# Patient Record
Sex: FEMALE | Race: OTHER | HISPANIC OR LATINO | URBAN - METROPOLITAN AREA
[De-identification: names, ages, dates, MRNs, and addresses within clinical notes are randomized per-mention and may not be internally consistent; named-entity substitution may affect disease eponyms.]

---

## 2018-07-23 ENCOUNTER — OUTPATIENT (OUTPATIENT)
Dept: OUTPATIENT SERVICES | Facility: HOSPITAL | Age: 33
LOS: 1 days | End: 2018-07-23
Payer: COMMERCIAL

## 2018-07-23 ENCOUNTER — APPOINTMENT (OUTPATIENT)
Dept: RADIOLOGY | Facility: CLINIC | Age: 33
End: 2018-07-23
Payer: COMMERCIAL

## 2018-07-23 DIAGNOSIS — R52 PAIN, UNSPECIFIED: ICD-10-CM

## 2018-07-23 PROBLEM — Z00.00 ENCOUNTER FOR PREVENTIVE HEALTH EXAMINATION: Status: ACTIVE | Noted: 2018-07-23

## 2018-07-23 PROCEDURE — 73630 X-RAY EXAM OF FOOT: CPT | Mod: 26,50

## 2018-07-23 PROCEDURE — 73630 X-RAY EXAM OF FOOT: CPT

## 2018-09-11 ENCOUNTER — APPOINTMENT (OUTPATIENT)
Dept: OBGYN | Facility: CLINIC | Age: 33
End: 2018-09-11
Payer: COMMERCIAL

## 2018-09-11 VITALS
BODY MASS INDEX: 33.32 KG/M2 | HEART RATE: 70 BPM | DIASTOLIC BLOOD PRESSURE: 80 MMHG | HEIGHT: 65 IN | WEIGHT: 200 LBS | SYSTOLIC BLOOD PRESSURE: 124 MMHG

## 2018-09-11 DIAGNOSIS — Z78.9 OTHER SPECIFIED HEALTH STATUS: ICD-10-CM

## 2018-09-11 PROCEDURE — 99385 PREV VISIT NEW AGE 18-39: CPT

## 2018-09-24 LAB
C TRACH RRNA SPEC QL NAA+PROBE: NOT DETECTED
CYTOLOGY CVX/VAG DOC THIN PREP: NORMAL
HPV HIGH+LOW RISK DNA PNL CVX: NOT DETECTED
N GONORRHOEA RRNA SPEC QL NAA+PROBE: NOT DETECTED
SOURCE TP AMPLIFICATION: NORMAL

## 2018-10-02 ENCOUNTER — RESULT REVIEW (OUTPATIENT)
Age: 33
End: 2018-10-02

## 2020-06-01 ENCOUNTER — APPOINTMENT (OUTPATIENT)
Dept: OBGYN | Facility: CLINIC | Age: 35
End: 2020-06-01
Payer: MEDICAID

## 2020-06-01 VITALS
BODY MASS INDEX: 33.87 KG/M2 | DIASTOLIC BLOOD PRESSURE: 50 MMHG | SYSTOLIC BLOOD PRESSURE: 120 MMHG | WEIGHT: 203.31 LBS | HEIGHT: 65 IN

## 2020-06-01 PROCEDURE — 99214 OFFICE O/P EST MOD 30 MIN: CPT

## 2020-06-01 RX ORDER — MEDROXYPROGESTERONE ACETATE 150 MG/ML
150 INJECTION, SUSPENSION INTRAMUSCULAR
Refills: 0 | Status: DISCONTINUED | COMMUNITY
End: 2020-06-01

## 2020-06-03 LAB
C TRACH RRNA SPEC QL NAA+PROBE: NOT DETECTED
CYTOLOGY CVX/VAG DOC THIN PREP: ABNORMAL
HPV HIGH+LOW RISK DNA PNL CVX: NOT DETECTED
N GONORRHOEA RRNA SPEC QL NAA+PROBE: NOT DETECTED
SOURCE TP AMPLIFICATION: NORMAL

## 2020-06-03 RX ORDER — VITAMIN C, CALCIUM, IRON, VITAMIN D3, VITAMIN E, THIAMIN, RIBOFLAVIN, NIACINAMIDE, VITAMIN B6, FOLIC ACID, IODINE, ZINC, COPPER, DOCUSATE SODIUM 120; 85; 30; 3; 20; 20; 1; 25; 2; 50; 159; 4.54; 150; 5; 400; 3.4 MG/1; MG/1; [IU]/1; MG/1; MG/1; MG/1; MG/1; MG/1; MG/1; MG/1; MG/1; MG/1; UG/1; MG/1; [IU]/1; MG/1
90-1 & 300 TABLET ORAL
Qty: 90 | Refills: 3 | Status: DISCONTINUED | COMMUNITY
Start: 2020-06-01 | End: 2020-06-03

## 2020-06-03 RX ORDER — VITAMIN A ACETATE, .BETA.-CAROTENE, ASCORBIC ACID, CHOLECALCIFEROL, .ALPHA.-TOCOPHEROL ACETATE, DL-, THIAMINE MONONITRATE, RIBOFLAVIN, NIACINAMIDE, PYRIDOXINE HYDROCHLORIDE, FOLIC ACID, CYANOCOBALAMIN, CALCIUM CARBONATE, FERROUS FUMARATE, ZINC OXIDE, AND CUPRIC OXIDE 2000; 2000; 120; 400; 22; 1.84; 3; 20; 10; 1; 12; 200; 27; 25; 2 [IU]/1; [IU]/1; MG/1; [IU]/1; MG/1; MG/1; MG/1; MG/1; MG/1; MG/1; UG/1; MG/1; MG/1; MG/1; MG/1
27-1 TABLET ORAL
Qty: 90 | Refills: 3 | Status: ACTIVE | COMMUNITY
Start: 2020-06-03 | End: 1900-01-01

## 2020-06-03 NOTE — CHIEF COMPLAINT
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

## 2020-06-03 NOTE — HISTORY OF PRESENT ILLNESS
[Good] : being in good health [___ Month(s) Ago] : [unfilled] month(s) ago [Healthy Diet] : a healthy diet [Reproductive Age] : is of reproductive age [Last Pap ___] : Last cervical pap smear was [unfilled] [Definite:  ___ (Date)] : the last menstrual period was [unfilled] [Normal Amount/Duration] : was of a normal amount and duration [Frequency: Q ___ days] : menstrual periods occur approximately every [unfilled] days [Menarche Age: ____] : age at menarche was [unfilled] [Regular Cycle Intervals] : periods have been regular [Menstrual Cramps] : menstrual cramps [Sexually Active] : is sexually active [Monogamous] : is monogamous [Male ___] : [unfilled] male [No] : no [Up to Date] : not up to date with ~his/her~ immunizations [HIV] : HIV - [HSV] : HSV - [RPR] : RPR - [Hepatitis] : Hepatitis - [Chlamydia] : Chlamydia - [Gonorrhea] : Gonorrhea - [HPV] : HPV - [de-identified] : no hx of abnormal pap [Spotting Between  Menses] : no spotting between menses [Contraception] : does not use contraception [de-identified] : with partner x 4 years

## 2020-06-10 DIAGNOSIS — Z01.419 ENCOUNTER FOR GYNECOLOGICAL EXAMINATION (GENERAL) (ROUTINE) W/OUT ABNORMAL FINDINGS: ICD-10-CM

## 2020-06-10 DIAGNOSIS — Z13.71 ENCOUNTER FOR NONPROCREATIVE SCREENING FOR GENETIC DISEASE CARRIER STATUS: ICD-10-CM

## 2020-06-10 DIAGNOSIS — Z32.00 ENCOUNTER FOR PREGNANCY TEST, RESULT UNKNOWN: ICD-10-CM

## 2020-06-10 DIAGNOSIS — N91.1 SECONDARY AMENORRHEA: ICD-10-CM

## 2020-06-16 ENCOUNTER — ASOB RESULT (OUTPATIENT)
Age: 35
End: 2020-06-16

## 2020-06-16 ENCOUNTER — APPOINTMENT (OUTPATIENT)
Dept: OBGYN | Facility: CLINIC | Age: 35
End: 2020-06-16
Payer: MEDICAID

## 2020-06-16 ENCOUNTER — APPOINTMENT (OUTPATIENT)
Dept: ANTEPARTUM | Facility: CLINIC | Age: 35
End: 2020-06-16
Payer: MEDICAID

## 2020-06-16 ENCOUNTER — NON-APPOINTMENT (OUTPATIENT)
Age: 35
End: 2020-06-16

## 2020-06-16 VITALS
SYSTOLIC BLOOD PRESSURE: 118 MMHG | DIASTOLIC BLOOD PRESSURE: 68 MMHG | WEIGHT: 205 LBS | HEIGHT: 65 IN | BODY MASS INDEX: 34.16 KG/M2

## 2020-06-16 DIAGNOSIS — R51 HEADACHE: ICD-10-CM

## 2020-06-16 DIAGNOSIS — Z78.9 OTHER SPECIFIED HEALTH STATUS: ICD-10-CM

## 2020-06-16 PROCEDURE — 0500F INITIAL PRENATAL CARE VISIT: CPT

## 2020-06-16 PROCEDURE — 76815 OB US LIMITED FETUS(S): CPT

## 2020-06-16 PROCEDURE — 36415 COLL VENOUS BLD VENIPUNCTURE: CPT

## 2020-06-22 LAB
ABO + RH PNL BLD: NORMAL
ALBUMIN SERPL ELPH-MCNC: 4.2 G/DL
ALP BLD-CCNC: 39 U/L
ALT SERPL-CCNC: 11 U/L
ANION GAP SERPL CALC-SCNC: 20 MMOL/L
APPEARANCE: CLEAR
AR GENE MUT ANL BLD/T: NEGATIVE
AST SERPL-CCNC: 16 U/L
B19V IGG SER QL IA: 0.3 INDEX
B19V IGG+IGM SER-IMP: NEGATIVE
B19V IGG+IGM SER-IMP: NORMAL
B19V IGM FLD-ACNC: 0.1 INDEX
B19V IGM SER-ACNC: NEGATIVE
BASOPHILS # BLD AUTO: 0.04 K/UL
BASOPHILS NFR BLD AUTO: 0.4 %
BILIRUB SERPL-MCNC: <0.2 MG/DL
BILIRUBIN URINE: NEGATIVE
BLD GP AB SCN SERPL QL: NORMAL
BLOOD URINE: NEGATIVE
BUN SERPL-MCNC: 8 MG/DL
CALCIUM SERPL-MCNC: 9.7 MG/DL
CFTR MUT TESTED BLD/T: NEGATIVE
CHLORIDE SERPL-SCNC: 99 MMOL/L
CMV IGG SERPL QL: 9.7 U/ML
CMV IGG SERPL-IMP: POSITIVE
CO2 SERPL-SCNC: 20 MMOL/L
COLOR: YELLOW
CREAT SERPL-MCNC: 0.53 MG/DL
EOSINOPHIL # BLD AUTO: 0.06 K/UL
EOSINOPHIL NFR BLD AUTO: 0.6 %
GLUCOSE QUALITATIVE U: NEGATIVE
GLUCOSE SERPL-MCNC: 53 MG/DL
HBV SURFACE AG SER QL: NONREACTIVE
HCT VFR BLD CALC: 41.6 %
HCV AB SER QL: NONREACTIVE
HCV S/CO RATIO: 0.08 S/CO
HGB A MFR BLD: 97.3 %
HGB A2 MFR BLD: 2.7 %
HGB BLD-MCNC: 13.2 G/DL
HGB FRACT BLD-IMP: NORMAL
HIV1+2 AB SPEC QL IA.RAPID: NONREACTIVE
IMM GRANULOCYTES NFR BLD AUTO: 0.4 %
KETONES URINE: NEGATIVE
LEAD BLD-MCNC: NORMAL UG/DL
LEUKOCYTE ESTERASE URINE: NEGATIVE
LYMPHOCYTES # BLD AUTO: 2.14 K/UL
LYMPHOCYTES NFR BLD AUTO: 20.5 %
M TB IFN-G BLD-IMP: NEGATIVE
MAN DIFF?: NORMAL
MCHC RBC-ENTMCNC: 28.8 PG
MCHC RBC-ENTMCNC: 31.7 GM/DL
MCV RBC AUTO: 90.8 FL
MEV IGG FLD QL IA: 9.5 AU/ML
MEV IGG+IGM SER-IMP: NEGATIVE
MONOCYTES # BLD AUTO: 0.6 K/UL
MONOCYTES NFR BLD AUTO: 5.7 %
MUV AB SER-ACNC: POSITIVE
MUV IGG SER QL IA: 206 AU/ML
NEUTROPHILS # BLD AUTO: 7.57 K/UL
NEUTROPHILS NFR BLD AUTO: 72.4 %
NITRITE URINE: NEGATIVE
PH URINE: 6
PLATELET # BLD AUTO: 301 K/UL
POTASSIUM SERPL-SCNC: 3.7 MMOL/L
PROT SERPL-MCNC: 6.8 G/DL
PROTEIN URINE: NORMAL
QUANTIFERON TB PLUS MITOGEN MINUS NIL: 5.66 IU/ML
QUANTIFERON TB PLUS NIL: 0.01 IU/ML
QUANTIFERON TB PLUS TB1 MINUS NIL: 0.02 IU/ML
QUANTIFERON TB PLUS TB2 MINUS NIL: 0.01 IU/ML
RBC # BLD: 4.58 M/UL
RBC # FLD: 14.1 %
RUBV IGG FLD-ACNC: 1.4 INDEX
RUBV IGG SER-IMP: POSITIVE
SODIUM SERPL-SCNC: 139 MMOL/L
SPECIFIC GRAVITY URINE: 1.03
T GONDII AB SER-IMP: POSITIVE
T GONDII IGG SER QL: 120 IU/ML
T PALLIDUM AB SER QL IA: NEGATIVE
TSH SERPL-ACNC: 2.35 UIU/ML
UROBILINOGEN URINE: NORMAL
VZV AB TITR SER: POSITIVE
VZV IGG SER IF-ACNC: 480.5 INDEX
WBC # FLD AUTO: 10.45 K/UL

## 2020-06-28 LAB — FMR1 GENE MUT ANL BLD/T: NORMAL

## 2020-06-30 ENCOUNTER — APPOINTMENT (OUTPATIENT)
Dept: OBGYN | Facility: CLINIC | Age: 35
End: 2020-06-30
Payer: MEDICAID

## 2020-06-30 ENCOUNTER — NON-APPOINTMENT (OUTPATIENT)
Age: 35
End: 2020-06-30

## 2020-06-30 VITALS
WEIGHT: 207 LBS | BODY MASS INDEX: 34.49 KG/M2 | HEIGHT: 65 IN | SYSTOLIC BLOOD PRESSURE: 118 MMHG | DIASTOLIC BLOOD PRESSURE: 68 MMHG

## 2020-06-30 PROCEDURE — 0502F SUBSEQUENT PRENATAL CARE: CPT

## 2020-07-20 ENCOUNTER — APPOINTMENT (OUTPATIENT)
Dept: ANTEPARTUM | Facility: CLINIC | Age: 35
End: 2020-07-20
Payer: MEDICAID

## 2020-07-20 ENCOUNTER — ASOB RESULT (OUTPATIENT)
Age: 35
End: 2020-07-20

## 2020-07-20 PROCEDURE — 76811 OB US DETAILED SNGL FETUS: CPT

## 2020-07-27 ENCOUNTER — NON-APPOINTMENT (OUTPATIENT)
Age: 35
End: 2020-07-27

## 2020-07-27 ENCOUNTER — APPOINTMENT (OUTPATIENT)
Dept: OBGYN | Facility: CLINIC | Age: 35
End: 2020-07-27
Payer: MEDICAID

## 2020-07-27 VITALS
BODY MASS INDEX: 34.32 KG/M2 | HEIGHT: 65 IN | DIASTOLIC BLOOD PRESSURE: 68 MMHG | WEIGHT: 206 LBS | SYSTOLIC BLOOD PRESSURE: 108 MMHG

## 2020-07-27 LAB
2ND TRIMESTER DATA: NORMAL
AFP PNL SERPL: NORMAL
AFP SERPL-ACNC: NORMAL
B-HCG FREE SERPL-MCNC: NORMAL
CLINICAL BIOCHEMIST REVIEW: NORMAL
INHIBIN A SERPL-MCNC: NORMAL
NOTES NTD: NORMAL
U ESTRIOL SERPL-SCNC: NORMAL

## 2020-07-27 PROCEDURE — 0502F SUBSEQUENT PRENATAL CARE: CPT

## 2020-08-03 ENCOUNTER — APPOINTMENT (OUTPATIENT)
Dept: ANTEPARTUM | Facility: CLINIC | Age: 35
End: 2020-08-03
Payer: MEDICAID

## 2020-08-03 ENCOUNTER — ASOB RESULT (OUTPATIENT)
Age: 35
End: 2020-08-03

## 2020-08-03 PROCEDURE — 76816 OB US FOLLOW-UP PER FETUS: CPT

## 2020-08-24 ENCOUNTER — NON-APPOINTMENT (OUTPATIENT)
Age: 35
End: 2020-08-24

## 2020-08-24 ENCOUNTER — APPOINTMENT (OUTPATIENT)
Dept: OBGYN | Facility: CLINIC | Age: 35
End: 2020-08-24
Payer: MEDICAID

## 2020-08-24 VITALS
SYSTOLIC BLOOD PRESSURE: 112 MMHG | DIASTOLIC BLOOD PRESSURE: 68 MMHG | HEIGHT: 65 IN | BODY MASS INDEX: 34.87 KG/M2 | WEIGHT: 209.31 LBS

## 2020-08-24 PROCEDURE — 0502F SUBSEQUENT PRENATAL CARE: CPT

## 2020-08-29 LAB
BASOPHILS # BLD AUTO: 0.03 K/UL
BASOPHILS NFR BLD AUTO: 0.3 %
EOSINOPHIL # BLD AUTO: 0.03 K/UL
EOSINOPHIL NFR BLD AUTO: 0.3 %
GLUCOSE 1H P 50 G GLC PO SERPL-MCNC: 210 MG/DL
HCT VFR BLD CALC: 39.5 %
HGB BLD-MCNC: 12.4 G/DL
IMM GRANULOCYTES NFR BLD AUTO: 0.8 %
LYMPHOCYTES # BLD AUTO: 1.69 K/UL
LYMPHOCYTES NFR BLD AUTO: 15.7 %
MAN DIFF?: NORMAL
MCHC RBC-ENTMCNC: 28.5 PG
MCHC RBC-ENTMCNC: 31.4 GM/DL
MCV RBC AUTO: 90.8 FL
MONOCYTES # BLD AUTO: 0.45 K/UL
MONOCYTES NFR BLD AUTO: 4.2 %
NEUTROPHILS # BLD AUTO: 8.47 K/UL
NEUTROPHILS NFR BLD AUTO: 78.7 %
PLATELET # BLD AUTO: 300 K/UL
RBC # BLD: 4.35 M/UL
RBC # FLD: 14.6 %
WBC # FLD AUTO: 10.76 K/UL

## 2020-09-11 ENCOUNTER — APPOINTMENT (OUTPATIENT)
Dept: MATERNAL FETAL MEDICINE | Facility: CLINIC | Age: 35
End: 2020-09-11
Payer: MEDICAID

## 2020-09-11 ENCOUNTER — ASOB RESULT (OUTPATIENT)
Age: 35
End: 2020-09-11

## 2020-09-11 VITALS — BODY MASS INDEX: 34.82 KG/M2 | HEIGHT: 65 IN | WEIGHT: 209 LBS

## 2020-09-11 PROCEDURE — G0108 DIAB MANAGE TRN  PER INDIV: CPT | Mod: 95

## 2020-09-11 RX ORDER — LANCETS 33 GAUGE
EACH MISCELLANEOUS
Qty: 1 | Refills: 1 | Status: ACTIVE | COMMUNITY
Start: 2020-09-11 | End: 1900-01-01

## 2020-09-11 RX ORDER — BLOOD-GLUCOSE METER
W/DEVICE EACH MISCELLANEOUS
Qty: 1 | Refills: 0 | Status: ACTIVE | COMMUNITY
Start: 2020-09-11 | End: 1900-01-01

## 2020-09-11 RX ORDER — BLOOD SUGAR DIAGNOSTIC
STRIP MISCELLANEOUS
Qty: 2 | Refills: 3 | Status: ACTIVE | COMMUNITY
Start: 2020-09-11 | End: 1900-01-01

## 2020-09-22 ENCOUNTER — APPOINTMENT (OUTPATIENT)
Dept: OBGYN | Facility: CLINIC | Age: 35
End: 2020-09-22
Payer: MEDICAID

## 2020-09-22 ENCOUNTER — NON-APPOINTMENT (OUTPATIENT)
Age: 35
End: 2020-09-22

## 2020-09-22 VITALS
WEIGHT: 212 LBS | DIASTOLIC BLOOD PRESSURE: 60 MMHG | SYSTOLIC BLOOD PRESSURE: 110 MMHG | BODY MASS INDEX: 35.32 KG/M2 | HEIGHT: 65 IN

## 2020-09-22 DIAGNOSIS — R76.8 OTHER SPECIFIED ABNORMAL IMMUNOLOGICAL FINDINGS IN SERUM: ICD-10-CM

## 2020-09-22 PROCEDURE — 0502F SUBSEQUENT PRENATAL CARE: CPT

## 2020-09-22 RX ORDER — VALACYCLOVIR 500 MG/1
500 TABLET, FILM COATED ORAL DAILY
Qty: 30 | Refills: 6 | Status: ACTIVE | COMMUNITY
Start: 2020-09-22 | End: 1900-01-01

## 2020-09-25 ENCOUNTER — APPOINTMENT (OUTPATIENT)
Dept: ANTEPARTUM | Facility: CLINIC | Age: 35
End: 2020-09-25
Payer: MEDICAID

## 2020-09-25 ENCOUNTER — APPOINTMENT (OUTPATIENT)
Dept: MATERNAL FETAL MEDICINE | Facility: CLINIC | Age: 35
End: 2020-09-25
Payer: MEDICAID

## 2020-09-25 ENCOUNTER — ASOB RESULT (OUTPATIENT)
Age: 35
End: 2020-09-25

## 2020-09-25 VITALS
BODY MASS INDEX: 35.49 KG/M2 | WEIGHT: 213.25 LBS | HEART RATE: 80 BPM | RESPIRATION RATE: 18 BRPM | SYSTOLIC BLOOD PRESSURE: 104 MMHG | OXYGEN SATURATION: 99 % | DIASTOLIC BLOOD PRESSURE: 62 MMHG

## 2020-09-25 PROCEDURE — 76820 UMBILICAL ARTERY ECHO: CPT

## 2020-09-25 PROCEDURE — 76816 OB US FOLLOW-UP PER FETUS: CPT

## 2020-09-25 PROCEDURE — 93976 VASCULAR STUDY: CPT

## 2020-09-25 PROCEDURE — 76821 MIDDLE CEREBRAL ARTERY ECHO: CPT | Mod: 59

## 2020-09-25 PROCEDURE — 99215 OFFICE O/P EST HI 40 MIN: CPT | Mod: 25

## 2020-09-25 RX ORDER — TERCONAZOLE 8 MG/G
0.8 CREAM VAGINAL
Qty: 1 | Refills: 2 | Status: DISCONTINUED | COMMUNITY
Start: 2020-06-16 | End: 2020-09-25

## 2020-09-25 RX ORDER — ACETAMINOPHEN 325 MG/1
325 TABLET, FILM COATED ORAL
Refills: 0 | Status: ACTIVE | COMMUNITY

## 2020-09-25 NOTE — ACTIVE PROBLEMS
[Hypertension] : no hypertension [Heart Disease] : no heart disease [Autoimmune Disease] : no autoimmune disease [Renal Disease] : no kidney disease, no UTI [Neurologic Disorder] : no neurologic disorder, no epilepsy [Psychiatric Disorders] : no psychiatric disorders [Depression] : no depression, no post partum depression [Thrombophlebitis] : no varicosities, no phlebitis [Thyroid Disorder] : no thyroid dysfunction [Blood Transfusion (___ Ml)] : no history of blood transfusion

## 2020-09-25 NOTE — OB HISTORY
[Pregnancy History] : boy [___] : pregnancy complications occured [LMP: ___] : LMP: [unfilled] [WOLFGANG: ___] : WOLFGANG: [unfilled] [EGA: ___ wks] : EGA: [unfilled] wks [Spontaneous] : Spontaneous conception [Definite:  ___ (Date)] : the last menstrual period was [unfilled] [Regular Cycle Intervals] : periods have been regular [FreeTextEntry1] : Prenatal records were available for review. First prenatal visit was on June 16, 2020.\par \par A one-hour Glucola challenge test done on August 26, 2020 reported a glucose of 210 mg/dL; consistent with a diagnosis of gestational diabetes.She had a telehealth visit with our diabetes educator on September 11, 2020 for initial diabetes education and counseling.

## 2020-09-25 NOTE — DISCUSSION/SUMMARY
[FreeTextEntry1] : She is 29 weeks and 4 days gestation by her last menstrual period dates.\par \par She is overweight and obesity has been associated with a number of maternal complications such as gestational diabetes, pre-eclampsia, thrombophlebitis, labor abnormalities, post-term pregnancies,  delivery, and operative complications. Obesity has been associated with adverse fetal outcomes such as late stillbirth and  deliveries.  Obese women also have a two to three-fold increased incidence in congenital anomalies. There were no major fetal malformations seen during the fetal anatomy ultrasound examination. She has been diagnosed with gestational diabetes. \par \par Regarding her gestational diabetes, she states that she has been following the recommended diabetic diet. She performs fasting and 1 hour postprandial self glucose monitoring. My review of her glucose log book from 20 to 20 revealed 8/12 (67%) elevated fasting glucose values. She had one elevated postprandial glucose reading. She appears to be in suboptimal glycemic control. I informed her that maintaining euglycemia is the most important factor associated with good  outcomes in pregnancies complicated by gestational diabetes. I told her that poor glucose control may cause fetal macrosomia, shoulder dystocia,  delivery,  respiratory distress syndrome and  metabolic complications such as hypoglycemia and hyperbilirubinemia. I told her to eat three daily meals with 3 snacks to reduce postprandial glucose fluctuations. I told her that she is at risk for developing gestational hypertension or preeclampsia during the current pregnancy. I also told her that she is at risk for developing type 2 diabetes, metabolic syndrome and cardiovascular disease later in life.  I also recommend a 75 gram 2 hour OGTT approximately 6 - 8 weeks postpartum to determine whether she has impaired glucose tolerance or preexisting diabetes not diagnosed prior to the pregnancy. I believe that she needs more nutritional counseling. I made arrangements for her to see our diabetes educator on 10/9/20. I discussed using medication to lower her fasting glucose levels. I told her that insulin is considered the first line medications for lowering fasting glucose levels. I also told her that metformin is on oral hypoglycemic medication that can also be used to lower fasting glucose levels. She requested to take the oral hypoglycemic medication. I ordered liver function tests and a hemoglobin A1c level. She was scheduled to have a followup [facility] visit in 4 weeks.\par \par She had a prior  section delivery. She was informed of the risks and benefits of a trial of labor. She was informed of the risk of uterine rupture and the associated maternal complications such as hysterectomy, blood transfusions, and intensive care unit admission. She was also informed of the associated  adverse outcomes in cases of uterine rupture such as stillbirth, fetal hypoxia and neurological impairment (cerebral palsy). She expressed a desire to have a repeat  section birth with the current pregnancy. \par \par She is multiparous and we discussed the various methods available for contraception after delivery. She told me that she wants to have an operative sterilization procedure after the repeat  delivery. \par \par \par \par

## 2020-09-29 LAB
ALBUMIN SERPL ELPH-MCNC: 3.7 G/DL
ALP BLD-CCNC: 65 U/L
ALT SERPL-CCNC: 11 U/L
AST SERPL-CCNC: 15 U/L
BILIRUB DIRECT SERPL-MCNC: 0 MG/DL
BILIRUB INDIRECT SERPL-MCNC: 0.1 MG/DL
BILIRUB SERPL-MCNC: <0.2 MG/DL
ESTIMATED AVERAGE GLUCOSE: 108 MG/DL
HBA1C MFR BLD HPLC: 5.4 %
PROT SERPL-MCNC: 6.4 G/DL

## 2020-10-05 ENCOUNTER — APPOINTMENT (OUTPATIENT)
Dept: OBGYN | Facility: CLINIC | Age: 35
End: 2020-10-05
Payer: MEDICAID

## 2020-10-05 VITALS
BODY MASS INDEX: 35.62 KG/M2 | DIASTOLIC BLOOD PRESSURE: 60 MMHG | WEIGHT: 214.06 LBS | SYSTOLIC BLOOD PRESSURE: 108 MMHG

## 2020-10-05 DIAGNOSIS — Z23 ENCOUNTER FOR IMMUNIZATION: ICD-10-CM

## 2020-10-05 PROCEDURE — 0502F SUBSEQUENT PRENATAL CARE: CPT

## 2020-10-05 PROCEDURE — 90471 IMMUNIZATION ADMIN: CPT

## 2020-10-05 PROCEDURE — 90658 IIV3 VACCINE SPLT 0.5 ML IM: CPT

## 2020-10-07 ENCOUNTER — NON-APPOINTMENT (OUTPATIENT)
Age: 35
End: 2020-10-07

## 2020-10-09 ENCOUNTER — ASOB RESULT (OUTPATIENT)
Age: 35
End: 2020-10-09

## 2020-10-09 ENCOUNTER — APPOINTMENT (OUTPATIENT)
Dept: MATERNAL FETAL MEDICINE | Facility: CLINIC | Age: 35
End: 2020-10-09
Payer: MEDICAID

## 2020-10-09 DIAGNOSIS — O24.419 GESTATIONAL DIABETES MELLITUS IN PREGNANCY, UNSPECIFIED CONTROL: ICD-10-CM

## 2020-10-09 PROCEDURE — G0108 DIAB MANAGE TRN  PER INDIV: CPT | Mod: 95

## 2020-10-12 ENCOUNTER — APPOINTMENT (OUTPATIENT)
Dept: ANTEPARTUM | Facility: CLINIC | Age: 35
End: 2020-10-12

## 2020-10-16 ENCOUNTER — ASOB RESULT (OUTPATIENT)
Age: 35
End: 2020-10-16

## 2020-10-16 ENCOUNTER — APPOINTMENT (OUTPATIENT)
Dept: MATERNAL FETAL MEDICINE | Facility: CLINIC | Age: 35
End: 2020-10-16
Payer: MEDICAID

## 2020-10-16 PROCEDURE — G0108 DIAB MANAGE TRN  PER INDIV: CPT | Mod: 95

## 2020-10-23 ENCOUNTER — APPOINTMENT (OUTPATIENT)
Dept: MATERNAL FETAL MEDICINE | Facility: CLINIC | Age: 35
End: 2020-10-23
Payer: MEDICAID

## 2020-10-23 ENCOUNTER — APPOINTMENT (OUTPATIENT)
Dept: ANTEPARTUM | Facility: CLINIC | Age: 35
End: 2020-10-23
Payer: MEDICAID

## 2020-10-23 ENCOUNTER — ASOB RESULT (OUTPATIENT)
Age: 35
End: 2020-10-23

## 2020-10-23 VITALS
WEIGHT: 210 LBS | BODY MASS INDEX: 34.99 KG/M2 | HEIGHT: 65 IN | SYSTOLIC BLOOD PRESSURE: 98 MMHG | HEART RATE: 80 BPM | DIASTOLIC BLOOD PRESSURE: 60 MMHG | OXYGEN SATURATION: 98 % | RESPIRATION RATE: 18 BRPM

## 2020-10-23 VITALS
BODY MASS INDEX: 34.99 KG/M2 | SYSTOLIC BLOOD PRESSURE: 98 MMHG | HEART RATE: 80 BPM | WEIGHT: 210 LBS | DIASTOLIC BLOOD PRESSURE: 60 MMHG | RESPIRATION RATE: 18 BRPM | OXYGEN SATURATION: 98 % | HEIGHT: 65 IN

## 2020-10-23 PROCEDURE — 76821 MIDDLE CEREBRAL ARTERY ECHO: CPT | Mod: 59

## 2020-10-23 PROCEDURE — 99214 OFFICE O/P EST MOD 30 MIN: CPT | Mod: 25

## 2020-10-23 PROCEDURE — 76820 UMBILICAL ARTERY ECHO: CPT

## 2020-10-23 PROCEDURE — 76816 OB US FOLLOW-UP PER FETUS: CPT

## 2020-10-23 PROCEDURE — 76818 FETAL BIOPHYS PROFILE W/NST: CPT

## 2020-10-23 RX ORDER — METFORMIN HYDROCHLORIDE 500 MG/1
500 TABLET, COATED ORAL
Qty: 30 | Refills: 3 | Status: ACTIVE | COMMUNITY
Start: 2020-09-25

## 2020-10-23 NOTE — CHIEF COMPLAINT
[G ___] : G [unfilled] [P ___] : P [unfilled] [de-identified] : having gestational diabetes and requiring oral hypoglycemic medication

## 2020-10-23 NOTE — OB HISTORY
[Pregnancy History] : boy [___] : pregnancy complications occured [LMP: ___] : LMP: [unfilled] [WOLFGANG: ___] : WOLFGANG: [unfilled] [Spontaneous] : Spontaneous conception [Definite:  ___ (Date)] : the last menstrual period was [unfilled] [Regular Cycle Intervals] : periods have been regular [EGA: ___ wks] : EGA: [unfilled] wks [FreeTextEntry1] : Prenatal records were available for review. First prenatal visit was on June 16, 2020.\par \par A one-hour Glucola challenge test done on August 26, 2020 reported a glucose of 210 mg/dL; consistent with a diagnosis of gestational diabetes.She had a telehealth visit with our diabetes educator on September 11, 2020 for initial diabetes education and counseling. \par \par She had a maternal fetal medicine visit with me on September 25, 2020. She was prescribed metformin 500 mg at bedtime to reduce her fasting glucose levels.\par \par She had a followup visit with our diabetes educator on October 9, 2020. The metformin medication was increased to 850 mg at bedtime because of persistently elevated fasting glucose levels. She also had a followup visit on October 16, 2020. She was advised to increase her metformin medication to 1000 mg at bedtime. She informed the diabetes educator that she was willing to take metformin 500 mg at bedtime only because of medication side effects when taking the higher dosages.\par \par \par \par

## 2020-10-23 NOTE — DISCUSSION/SUMMARY
[FreeTextEntry1] : She is 33 weeks and 4 days gestation by her last menstrual period dates.\par \par Regarding her gestational diabetes, she is currently taking metformin 500 mg before bedtime with a snack. She states that she has been following the recommended diabetic diet. She states that she is eating 3 meals and 2 snacks each day. She performs fasting and 1 hour postprandial self glucose monitoring. My review of her glucose log book from 10/17/20 to 10/22/20 revealed 5/6 (83%) elevated fasting glucose values. She had 2 elevated postprandial glucose readings from non adherence to the diet. Her glycemic control remains suboptimal due to the elevated fasting glucose readings. A hemoglobin A1c done on 2020 was 5.4% which corresponds to estimated daily average glucose of 108 mg/dL. I again informed her that maintaining euglycemia is the most important factor associated with good  outcomes in pregnancies complicated by gestational diabetes. I told her that poor glucose control may cause fetal macrosomia, shoulder dystocia,  delivery,  respiratory distress syndrome and  metabolic complications such as hypoglycemia and hyperbilirubinemia. I again told her that she is at risk for developing gestational hypertension or preeclampsia during the current pregnancy.  The metformin dosage was increased to 850 mg at bedtime. However, she stopped taking to 850 mg of metformin because she was experiencing palpitations and tremors when she took the increased dosage. She does not know whether she had low glucose levels. I recommended that she start taking insulin injections at bedtime. She is still refusing to take the insulin injections. She prefers to continue taking the 500 mg of metformin at bedtime, and modify her diet. I provided additional nutritional counseling. I gave her written examples in [place]an of what to eat and what not to eat. She was also given examples of food items to eat for snacks.  She has been scheduled to have a follow up visit with the diabetes educator on 10/30/20. She was scheduled to have a followup [facility] visit in 3 weeks.\par \par \par \par \par \par \par

## 2020-10-23 NOTE — VITALS
[LMP (date): ___] : LMP was on [unfilled] [GA =___ Weeks] : which calculates to a GA of [unfilled] weeks [GA= ___ Days] : and [unfilled] day(s) [WOLFGANG by LMP (date): ___] : The calculated WOLFGANG by LMP is [unfilled] [By LMP] : this is the final WOLFGANG

## 2020-10-26 ENCOUNTER — APPOINTMENT (OUTPATIENT)
Dept: OBGYN | Facility: CLINIC | Age: 35
End: 2020-10-26
Payer: MEDICAID

## 2020-10-26 ENCOUNTER — NON-APPOINTMENT (OUTPATIENT)
Age: 35
End: 2020-10-26

## 2020-10-26 VITALS — DIASTOLIC BLOOD PRESSURE: 62 MMHG | WEIGHT: 210 LBS | BODY MASS INDEX: 34.95 KG/M2 | SYSTOLIC BLOOD PRESSURE: 96 MMHG

## 2020-10-26 DIAGNOSIS — Z3A.34 34 WEEKS GESTATION OF PREGNANCY: ICD-10-CM

## 2020-10-26 PROCEDURE — 36415 COLL VENOUS BLD VENIPUNCTURE: CPT

## 2020-10-26 PROCEDURE — 0502F SUBSEQUENT PRENATAL CARE: CPT

## 2020-10-29 ENCOUNTER — APPOINTMENT (OUTPATIENT)
Dept: ANTEPARTUM | Facility: CLINIC | Age: 35
End: 2020-10-29
Payer: MEDICAID

## 2020-10-29 ENCOUNTER — ASOB RESULT (OUTPATIENT)
Age: 35
End: 2020-10-29

## 2020-10-29 PROCEDURE — 93976 VASCULAR STUDY: CPT

## 2020-10-29 PROCEDURE — 99072 ADDL SUPL MATRL&STAF TM PHE: CPT

## 2020-10-29 PROCEDURE — 76820 UMBILICAL ARTERY ECHO: CPT

## 2020-10-29 PROCEDURE — 76818 FETAL BIOPHYS PROFILE W/NST: CPT

## 2020-11-02 ENCOUNTER — ASOB RESULT (OUTPATIENT)
Age: 35
End: 2020-11-02

## 2020-11-02 ENCOUNTER — APPOINTMENT (OUTPATIENT)
Dept: MATERNAL FETAL MEDICINE | Facility: CLINIC | Age: 35
End: 2020-11-02
Payer: MEDICAID

## 2020-11-02 DIAGNOSIS — Z3A.33 33 WEEKS GESTATION OF PREGNANCY: ICD-10-CM

## 2020-11-02 PROCEDURE — G0108 DIAB MANAGE TRN  PER INDIV: CPT | Mod: 95

## 2020-11-04 LAB — HIV1+2 AB SPEC QL IA.RAPID: NONREACTIVE

## 2020-11-05 ENCOUNTER — APPOINTMENT (OUTPATIENT)
Dept: ANTEPARTUM | Facility: CLINIC | Age: 35
End: 2020-11-05
Payer: MEDICAID

## 2020-11-05 ENCOUNTER — ASOB RESULT (OUTPATIENT)
Age: 35
End: 2020-11-05

## 2020-11-05 PROCEDURE — 99072 ADDL SUPL MATRL&STAF TM PHE: CPT

## 2020-11-05 PROCEDURE — 76818 FETAL BIOPHYS PROFILE W/NST: CPT

## 2020-11-10 DIAGNOSIS — Z3A.35 35 WEEKS GESTATION OF PREGNANCY: ICD-10-CM

## 2020-11-12 ENCOUNTER — APPOINTMENT (OUTPATIENT)
Dept: OBGYN | Facility: CLINIC | Age: 35
End: 2020-11-12
Payer: MEDICAID

## 2020-11-12 ENCOUNTER — APPOINTMENT (OUTPATIENT)
Dept: ANTEPARTUM | Facility: CLINIC | Age: 35
End: 2020-11-12

## 2020-11-12 ENCOUNTER — APPOINTMENT (OUTPATIENT)
Dept: ANTEPARTUM | Facility: CLINIC | Age: 35
End: 2020-11-12
Payer: MEDICAID

## 2020-11-12 ENCOUNTER — NON-APPOINTMENT (OUTPATIENT)
Age: 35
End: 2020-11-12

## 2020-11-12 ENCOUNTER — APPOINTMENT (OUTPATIENT)
Dept: MATERNAL FETAL MEDICINE | Facility: CLINIC | Age: 35
End: 2020-11-12
Payer: MEDICAID

## 2020-11-12 ENCOUNTER — ASOB RESULT (OUTPATIENT)
Age: 35
End: 2020-11-12

## 2020-11-12 VITALS
DIASTOLIC BLOOD PRESSURE: 62 MMHG | SYSTOLIC BLOOD PRESSURE: 108 MMHG | WEIGHT: 210.25 LBS | HEIGHT: 65 IN | BODY MASS INDEX: 35.03 KG/M2

## 2020-11-12 VITALS
RESPIRATION RATE: 16 BRPM | BODY MASS INDEX: 35.16 KG/M2 | WEIGHT: 211 LBS | HEIGHT: 65 IN | OXYGEN SATURATION: 99 % | DIASTOLIC BLOOD PRESSURE: 62 MMHG | HEART RATE: 72 BPM | SYSTOLIC BLOOD PRESSURE: 100 MMHG

## 2020-11-12 PROCEDURE — 0502F SUBSEQUENT PRENATAL CARE: CPT

## 2020-11-12 PROCEDURE — 93976 VASCULAR STUDY: CPT

## 2020-11-12 PROCEDURE — 76820 UMBILICAL ARTERY ECHO: CPT

## 2020-11-12 PROCEDURE — 76818 FETAL BIOPHYS PROFILE W/NST: CPT

## 2020-11-12 PROCEDURE — 99214 OFFICE O/P EST MOD 30 MIN: CPT | Mod: 25

## 2020-11-12 NOTE — DISCUSSION/SUMMARY
[FreeTextEntry1] : Please see the previous consultation for the patient's medical and obstetrical history.  Umm is currently 36 weeks pregnant with history of previous  section, gestational diabetes A2 and maternal obesity.  Dietary consultation has been sent under separate cover.\par \par A biophysical profile and NST were performed today.  Biophysical profile was 8 out of 8 and NST was category 1.  Vital signs today reveal a blood pressure of 100/62, maternal weight is 211 pounds consistent with a BMI of 35.11 kg.  Evaluation of her diabetic flowsheets reveals good control of both fasting and postprandial blood sugars.  Those elevated postprandial blood sugars are due to dietary intake.\par \par Patient is currently on 850 mg of Metformin at bedtime.  No change in her current dosage is indicated.  Hemoglobin A1c was performed in September and was normal at 5.4%.  She will continue with weekly biophysical profile and NST.  A follow-up growth scan in 2 weeks is recommended.  She is scheduled for a repeat  section on .  Delivery prior to 39 weeks is not recommended at this time.  A 75 g 2-hour GCT should be performed after her postpartum visit.  She does complain of palpitations and cardiac evaluation should be performed.  Mitral valve prolapse was discussed.  She should take her Metformin the night prior to her delivery.  All of the above was discussed with the patient and all of her questions were answered.\par \par I spent a total of 30 minutes face-to-face of which greater than 50% was counseling and coordinating care.\par \par Recommendations;\par \par 1.  Continue current ADA diet and home glucose monitoring.\par 2.  Metformin 850 mg p.o. at bedtime.\par 3.  Weekly  testing until delivery.\par 4.  Growth scan in 2 weeks is recommended.\par 5.  Follow-up dietary consultation is scheduled.\par 6.  75 g 2-hour GCT after postpartum visit is recommended.\par 7.  Follow-up maternal-fetal medicine consultation as clinically indicated.

## 2020-11-14 ENCOUNTER — APPOINTMENT (OUTPATIENT)
Dept: ANTEPARTUM | Facility: CLINIC | Age: 35
End: 2020-11-14

## 2020-11-15 LAB
BASOPHILS # BLD AUTO: 0.02 K/UL
BASOPHILS NFR BLD AUTO: 0.2 %
EOSINOPHIL # BLD AUTO: 0.02 K/UL
EOSINOPHIL NFR BLD AUTO: 0.2 %
GP B STREP DNA SPEC QL NAA+PROBE: NORMAL
GP B STREP DNA SPEC QL NAA+PROBE: NOT DETECTED
HCT VFR BLD CALC: 43.8 %
HGB BLD-MCNC: 13.6 G/DL
IMM GRANULOCYTES NFR BLD AUTO: 0.7 %
LYMPHOCYTES # BLD AUTO: 1.58 K/UL
LYMPHOCYTES NFR BLD AUTO: 17.3 %
MAN DIFF?: NORMAL
MCHC RBC-ENTMCNC: 27.8 PG
MCHC RBC-ENTMCNC: 31.1 GM/DL
MCV RBC AUTO: 89.6 FL
MONOCYTES # BLD AUTO: 0.54 K/UL
MONOCYTES NFR BLD AUTO: 5.9 %
NEUTROPHILS # BLD AUTO: 6.92 K/UL
NEUTROPHILS NFR BLD AUTO: 75.7 %
PLATELET # BLD AUTO: 303 K/UL
RBC # BLD: 4.89 M/UL
RBC # FLD: 15.2 %
SOURCE GBS: NORMAL
WBC # FLD AUTO: 9.14 K/UL

## 2020-11-17 ENCOUNTER — OUTPATIENT (OUTPATIENT)
Dept: OUTPATIENT SERVICES | Facility: HOSPITAL | Age: 35
LOS: 1 days | End: 2020-11-17
Payer: MEDICAID

## 2020-11-17 VITALS — TEMPERATURE: 98 F | WEIGHT: 202.83 LBS | HEIGHT: 65 IN

## 2020-11-17 DIAGNOSIS — Z98.891 HISTORY OF UTERINE SCAR FROM PREVIOUS SURGERY: Chronic | ICD-10-CM

## 2020-11-17 DIAGNOSIS — Z86.19 PERSONAL HISTORY OF OTHER INFECTIOUS AND PARASITIC DISEASES: ICD-10-CM

## 2020-11-17 DIAGNOSIS — Z3A.36 36 WEEKS GESTATION OF PREGNANCY: ICD-10-CM

## 2020-11-17 DIAGNOSIS — Z01.818 ENCOUNTER FOR OTHER PREPROCEDURAL EXAMINATION: ICD-10-CM

## 2020-11-17 LAB
ANION GAP SERPL CALC-SCNC: 11 MMOL/L — SIGNIFICANT CHANGE UP (ref 5–17)
APPEARANCE UR: ABNORMAL
BACTERIA # UR AUTO: ABNORMAL
BASOPHILS # BLD AUTO: 0.02 K/UL — SIGNIFICANT CHANGE UP (ref 0–0.2)
BASOPHILS NFR BLD AUTO: 0.2 % — SIGNIFICANT CHANGE UP (ref 0–2)
BILIRUB UR-MCNC: NEGATIVE — SIGNIFICANT CHANGE UP
BLD GP AB SCN SERPL QL: SIGNIFICANT CHANGE UP
BUN SERPL-MCNC: 6 MG/DL — LOW (ref 8–20)
CALCIUM SERPL-MCNC: 8.7 MG/DL — SIGNIFICANT CHANGE UP (ref 8.6–10.2)
CHLORIDE SERPL-SCNC: 101 MMOL/L — SIGNIFICANT CHANGE UP (ref 98–107)
CO2 SERPL-SCNC: 21 MMOL/L — LOW (ref 22–29)
COLOR SPEC: YELLOW — SIGNIFICANT CHANGE UP
CREAT SERPL-MCNC: 0.48 MG/DL — LOW (ref 0.5–1.3)
DIFF PNL FLD: ABNORMAL
EOSINOPHIL # BLD AUTO: 0.02 K/UL — SIGNIFICANT CHANGE UP (ref 0–0.5)
EOSINOPHIL NFR BLD AUTO: 0.2 % — SIGNIFICANT CHANGE UP (ref 0–6)
EPI CELLS # UR: SIGNIFICANT CHANGE UP
GLUCOSE SERPL-MCNC: 93 MG/DL — SIGNIFICANT CHANGE UP (ref 70–99)
GLUCOSE UR QL: NEGATIVE MG/DL — SIGNIFICANT CHANGE UP
HCT VFR BLD CALC: 39.6 % — SIGNIFICANT CHANGE UP (ref 34.5–45)
HGB BLD-MCNC: 13.5 G/DL — SIGNIFICANT CHANGE UP (ref 11.5–15.5)
IMM GRANULOCYTES NFR BLD AUTO: 0.6 % — SIGNIFICANT CHANGE UP (ref 0–1.5)
KETONES UR-MCNC: NEGATIVE — SIGNIFICANT CHANGE UP
LEUKOCYTE ESTERASE UR-ACNC: ABNORMAL
LYMPHOCYTES # BLD AUTO: 1.58 K/UL — SIGNIFICANT CHANGE UP (ref 1–3.3)
LYMPHOCYTES # BLD AUTO: 17.4 % — SIGNIFICANT CHANGE UP (ref 13–44)
MCHC RBC-ENTMCNC: 29 PG — SIGNIFICANT CHANGE UP (ref 27–34)
MCHC RBC-ENTMCNC: 34.1 GM/DL — SIGNIFICANT CHANGE UP (ref 32–36)
MCV RBC AUTO: 85.2 FL — SIGNIFICANT CHANGE UP (ref 80–100)
MONOCYTES # BLD AUTO: 0.62 K/UL — SIGNIFICANT CHANGE UP (ref 0–0.9)
MONOCYTES NFR BLD AUTO: 6.8 % — SIGNIFICANT CHANGE UP (ref 2–14)
NEUTROPHILS # BLD AUTO: 6.79 K/UL — SIGNIFICANT CHANGE UP (ref 1.8–7.4)
NEUTROPHILS NFR BLD AUTO: 74.8 % — SIGNIFICANT CHANGE UP (ref 43–77)
NITRITE UR-MCNC: NEGATIVE — SIGNIFICANT CHANGE UP
PH UR: 6.5 — SIGNIFICANT CHANGE UP (ref 5–8)
PLATELET # BLD AUTO: 248 K/UL — SIGNIFICANT CHANGE UP (ref 150–400)
POTASSIUM SERPL-MCNC: 3.8 MMOL/L — SIGNIFICANT CHANGE UP (ref 3.5–5.3)
POTASSIUM SERPL-SCNC: 3.8 MMOL/L — SIGNIFICANT CHANGE UP (ref 3.5–5.3)
PROT UR-MCNC: 15 MG/DL
RBC # BLD: 4.65 M/UL — SIGNIFICANT CHANGE UP (ref 3.8–5.2)
RBC # FLD: 14.6 % — HIGH (ref 10.3–14.5)
RBC CASTS # UR COMP ASSIST: SIGNIFICANT CHANGE UP /HPF (ref 0–4)
SODIUM SERPL-SCNC: 133 MMOL/L — LOW (ref 135–145)
SP GR SPEC: 1.01 — SIGNIFICANT CHANGE UP (ref 1.01–1.02)
UROBILINOGEN FLD QL: NEGATIVE MG/DL — SIGNIFICANT CHANGE UP
WBC # BLD: 9.08 K/UL — SIGNIFICANT CHANGE UP (ref 3.8–10.5)
WBC # FLD AUTO: 9.08 K/UL — SIGNIFICANT CHANGE UP (ref 3.8–10.5)
WBC UR QL: ABNORMAL

## 2020-11-17 PROCEDURE — T1013: CPT

## 2020-11-17 RX ORDER — VALACYCLOVIR 500 MG/1
850 TABLET, FILM COATED ORAL
Qty: 0 | Refills: 0 | DISCHARGE

## 2020-11-17 NOTE — OB PST NOTE - PMH
COVID-19  positive antibodies 6-20, pt reports symptoms in february 2020  Gestational diabetes mellitus (GDM), antepartum, gestational diabetes method of control unspecified    HSV infection

## 2020-11-19 ENCOUNTER — NON-APPOINTMENT (OUTPATIENT)
Age: 35
End: 2020-11-19

## 2020-11-19 ENCOUNTER — ASOB RESULT (OUTPATIENT)
Age: 35
End: 2020-11-19

## 2020-11-19 ENCOUNTER — APPOINTMENT (OUTPATIENT)
Dept: OBGYN | Facility: CLINIC | Age: 35
End: 2020-11-19
Payer: MEDICAID

## 2020-11-19 ENCOUNTER — APPOINTMENT (OUTPATIENT)
Dept: ANTEPARTUM | Facility: CLINIC | Age: 35
End: 2020-11-19
Payer: MEDICAID

## 2020-11-19 VITALS
WEIGHT: 215.19 LBS | DIASTOLIC BLOOD PRESSURE: 60 MMHG | SYSTOLIC BLOOD PRESSURE: 124 MMHG | BODY MASS INDEX: 35.81 KG/M2

## 2020-11-19 DIAGNOSIS — Z3A.37 37 WEEKS GESTATION OF PREGNANCY: ICD-10-CM

## 2020-11-19 PROCEDURE — 93976 VASCULAR STUDY: CPT

## 2020-11-19 PROCEDURE — 76820 UMBILICAL ARTERY ECHO: CPT

## 2020-11-19 PROCEDURE — 0502F SUBSEQUENT PRENATAL CARE: CPT

## 2020-11-19 PROCEDURE — 76816 OB US FOLLOW-UP PER FETUS: CPT

## 2020-11-19 PROCEDURE — 76818 FETAL BIOPHYS PROFILE W/NST: CPT

## 2020-11-23 ENCOUNTER — APPOINTMENT (OUTPATIENT)
Dept: MATERNAL FETAL MEDICINE | Facility: CLINIC | Age: 35
End: 2020-11-23

## 2020-11-23 PROBLEM — U07.1 COVID-19: Chronic | Status: ACTIVE | Noted: 2020-11-17

## 2020-11-23 PROBLEM — O24.419 GESTATIONAL DIABETES MELLITUS IN PREGNANCY, UNSPECIFIED CONTROL: Chronic | Status: ACTIVE | Noted: 2020-11-17

## 2020-11-23 PROBLEM — B00.9 HERPESVIRAL INFECTION, UNSPECIFIED: Chronic | Status: ACTIVE | Noted: 2020-11-17

## 2020-11-24 PROBLEM — Z3A.37 37 WEEKS GESTATION OF PREGNANCY: Status: RESOLVED | Noted: 2020-11-24 | Resolved: 2020-11-26

## 2020-11-25 ENCOUNTER — ASOB RESULT (OUTPATIENT)
Age: 35
End: 2020-11-25

## 2020-11-25 ENCOUNTER — APPOINTMENT (OUTPATIENT)
Dept: ANTEPARTUM | Facility: CLINIC | Age: 35
End: 2020-11-25
Payer: MEDICAID

## 2020-11-25 PROCEDURE — 76820 UMBILICAL ARTERY ECHO: CPT

## 2020-11-25 PROCEDURE — 76818 FETAL BIOPHYS PROFILE W/NST: CPT

## 2020-11-25 PROCEDURE — 93976 VASCULAR STUDY: CPT

## 2020-11-29 ENCOUNTER — OUTPATIENT (OUTPATIENT)
Dept: OUTPATIENT SERVICES | Facility: HOSPITAL | Age: 35
LOS: 1 days | End: 2020-11-29
Payer: MEDICAID

## 2020-11-29 DIAGNOSIS — Z11.59 ENCOUNTER FOR SCREENING FOR OTHER VIRAL DISEASES: ICD-10-CM

## 2020-11-29 DIAGNOSIS — Z98.891 HISTORY OF UTERINE SCAR FROM PREVIOUS SURGERY: Chronic | ICD-10-CM

## 2020-11-29 LAB — SARS-COV-2 RNA SPEC QL NAA+PROBE: SIGNIFICANT CHANGE UP

## 2020-11-29 PROCEDURE — U0003: CPT

## 2020-11-30 ENCOUNTER — TRANSCRIPTION ENCOUNTER (OUTPATIENT)
Age: 35
End: 2020-11-30

## 2020-12-01 ENCOUNTER — RESULT REVIEW (OUTPATIENT)
Age: 35
End: 2020-12-01

## 2020-12-01 ENCOUNTER — TRANSCRIPTION ENCOUNTER (OUTPATIENT)
Age: 35
End: 2020-12-01

## 2020-12-01 ENCOUNTER — INPATIENT (INPATIENT)
Facility: HOSPITAL | Age: 35
LOS: 2 days | Discharge: HOME | End: 2020-12-04
Attending: SPECIALIST | Admitting: SPECIALIST
Payer: MEDICAID

## 2020-12-01 ENCOUNTER — APPOINTMENT (OUTPATIENT)
Dept: OBGYN | Facility: HOSPITAL | Age: 35
End: 2020-12-01

## 2020-12-01 VITALS
WEIGHT: 210.1 LBS | DIASTOLIC BLOOD PRESSURE: 69 MMHG | RESPIRATION RATE: 20 BRPM | SYSTOLIC BLOOD PRESSURE: 112 MMHG | TEMPERATURE: 98 F | HEART RATE: 81 BPM | HEIGHT: 65 IN

## 2020-12-01 DIAGNOSIS — O34.219 MATERNAL CARE FOR UNSPECIFIED TYPE SCAR FROM PREVIOUS CESAREAN DELIVERY: ICD-10-CM

## 2020-12-01 DIAGNOSIS — Z3A.39 39 WEEKS GESTATION OF PREGNANCY: ICD-10-CM

## 2020-12-01 DIAGNOSIS — Z98.891 HISTORY OF UTERINE SCAR FROM PREVIOUS SURGERY: Chronic | ICD-10-CM

## 2020-12-01 LAB
BLD GP AB SCN SERPL QL: SIGNIFICANT CHANGE UP
GLUCOSE BLDC GLUCOMTR-MCNC: 78 MG/DL — SIGNIFICANT CHANGE UP (ref 70–99)
SARS-COV-2 IGG SERPL QL IA: NEGATIVE — SIGNIFICANT CHANGE UP
SARS-COV-2 IGM SERPL IA-ACNC: <0.1 INDEX — SIGNIFICANT CHANGE UP
T PALLIDUM AB TITR SER: NEGATIVE — SIGNIFICANT CHANGE UP

## 2020-12-01 PROCEDURE — 88307 TISSUE EXAM BY PATHOLOGIST: CPT | Mod: 26

## 2020-12-01 PROCEDURE — 59510 CESAREAN DELIVERY: CPT | Mod: U9

## 2020-12-01 PROCEDURE — 88302 TISSUE EXAM BY PATHOLOGIST: CPT | Mod: 26

## 2020-12-01 PROCEDURE — 58700 REMOVAL OF FALLOPIAN TUBE: CPT

## 2020-12-01 RX ORDER — OXYTOCIN 10 UNIT/ML
333.33 VIAL (ML) INJECTION
Qty: 20 | Refills: 0 | Status: DISCONTINUED | OUTPATIENT
Start: 2020-12-01 | End: 2020-12-04

## 2020-12-01 RX ORDER — LANOLIN
1 OINTMENT (GRAM) TOPICAL EVERY 6 HOURS
Refills: 0 | Status: DISCONTINUED | OUTPATIENT
Start: 2020-12-01 | End: 2020-12-04

## 2020-12-01 RX ORDER — CEFAZOLIN SODIUM 1 G
2000 VIAL (EA) INJECTION ONCE
Refills: 0 | Status: COMPLETED | OUTPATIENT
Start: 2020-12-01 | End: 2020-12-01

## 2020-12-01 RX ORDER — SODIUM CHLORIDE 9 MG/ML
1000 INJECTION, SOLUTION INTRAVENOUS
Refills: 0 | Status: DISCONTINUED | OUTPATIENT
Start: 2020-12-01 | End: 2020-12-04

## 2020-12-01 RX ORDER — MAGNESIUM HYDROXIDE 400 MG/1
30 TABLET, CHEWABLE ORAL
Refills: 0 | Status: DISCONTINUED | OUTPATIENT
Start: 2020-12-01 | End: 2020-12-04

## 2020-12-01 RX ORDER — ACETAMINOPHEN 500 MG
975 TABLET ORAL
Refills: 0 | Status: DISCONTINUED | OUTPATIENT
Start: 2020-12-01 | End: 2020-12-04

## 2020-12-01 RX ORDER — SODIUM CHLORIDE 9 MG/ML
1000 INJECTION, SOLUTION INTRAVENOUS ONCE
Refills: 0 | Status: COMPLETED | OUTPATIENT
Start: 2020-12-01 | End: 2020-12-01

## 2020-12-01 RX ORDER — SODIUM CHLORIDE 9 MG/ML
1000 INJECTION, SOLUTION INTRAVENOUS
Refills: 0 | Status: DISCONTINUED | OUTPATIENT
Start: 2020-12-01 | End: 2020-12-01

## 2020-12-01 RX ORDER — CITRIC ACID/SODIUM CITRATE 300-500 MG
30 SOLUTION, ORAL ORAL ONCE
Refills: 0 | Status: COMPLETED | OUTPATIENT
Start: 2020-12-01 | End: 2020-12-01

## 2020-12-01 RX ORDER — KETOROLAC TROMETHAMINE 30 MG/ML
30 SYRINGE (ML) INJECTION EVERY 6 HOURS
Refills: 0 | Status: DISCONTINUED | OUTPATIENT
Start: 2020-12-01 | End: 2020-12-03

## 2020-12-01 RX ORDER — SIMETHICONE 80 MG/1
80 TABLET, CHEWABLE ORAL EVERY 4 HOURS
Refills: 0 | Status: DISCONTINUED | OUTPATIENT
Start: 2020-12-01 | End: 2020-12-04

## 2020-12-01 RX ORDER — FAMOTIDINE 10 MG/ML
20 INJECTION INTRAVENOUS ONCE
Refills: 0 | Status: COMPLETED | OUTPATIENT
Start: 2020-12-01 | End: 2020-12-01

## 2020-12-01 RX ORDER — DIPHENHYDRAMINE HCL 50 MG
25 CAPSULE ORAL EVERY 6 HOURS
Refills: 0 | Status: DISCONTINUED | OUTPATIENT
Start: 2020-12-01 | End: 2020-12-04

## 2020-12-01 RX ORDER — METOCLOPRAMIDE HCL 10 MG
10 TABLET ORAL ONCE
Refills: 0 | Status: DISCONTINUED | OUTPATIENT
Start: 2020-12-01 | End: 2020-12-01

## 2020-12-01 RX ORDER — ENOXAPARIN SODIUM 100 MG/ML
40 INJECTION SUBCUTANEOUS DAILY
Refills: 0 | Status: DISCONTINUED | OUTPATIENT
Start: 2020-12-01 | End: 2020-12-04

## 2020-12-01 RX ADMIN — SODIUM CHLORIDE 125 MILLILITER(S): 9 INJECTION, SOLUTION INTRAVENOUS at 07:21

## 2020-12-01 RX ADMIN — Medication 30 MILLIGRAM(S): at 20:16

## 2020-12-01 RX ADMIN — Medication 30 MILLIGRAM(S): at 14:03

## 2020-12-01 RX ADMIN — Medication 30 MILLILITER(S): at 07:06

## 2020-12-01 RX ADMIN — Medication 975 MILLIGRAM(S): at 22:00

## 2020-12-01 RX ADMIN — SODIUM CHLORIDE 2000 MILLILITER(S): 9 INJECTION, SOLUTION INTRAVENOUS at 06:45

## 2020-12-01 RX ADMIN — Medication 975 MILLIGRAM(S): at 23:00

## 2020-12-01 RX ADMIN — Medication 975 MILLIGRAM(S): at 16:40

## 2020-12-01 RX ADMIN — Medication 30 MILLIGRAM(S): at 13:48

## 2020-12-01 RX ADMIN — Medication 100 MILLIGRAM(S): at 07:44

## 2020-12-01 RX ADMIN — FAMOTIDINE 20 MILLIGRAM(S): 10 INJECTION INTRAVENOUS at 07:06

## 2020-12-01 RX ADMIN — Medication 30 MILLIGRAM(S): at 20:01

## 2020-12-01 RX ADMIN — Medication 975 MILLIGRAM(S): at 15:43

## 2020-12-01 RX ADMIN — Medication 1000 MILLIUNIT(S)/MIN: at 08:51

## 2020-12-01 RX ADMIN — ENOXAPARIN SODIUM 40 MILLIGRAM(S): 100 INJECTION SUBCUTANEOUS at 18:31

## 2020-12-01 NOTE — OB NEONATOLOGY/PEDIATRICIAN DELIVERY SUMMARY - NS_[facility]_OBGYN_ALL_OB
Rest the back. No heavy lifting or straining until your pain has resolved.     May slowly increased activities as tolerated. If something causes you pain, you are doing too much.    May take tylenol as needed for discomfort.    Do not take any other NSAIDs (ibuprofen, advil, aleve, naprosyn, etc) while taking the Etodolac.    Apply ice multiple times daily for the first 2-3 days. Then, alternate ice and heat multiple times daily. Ice will help to decrease swelling and pain. Heat will help to relax the muscles.    Follow up with physical therapy for evaluation and treatment of your back pain.    Follow up with chiropractic therapy for your back pain.     Follow up with primary care provider in 2-3 weeks if no improvement of symptoms. Sooner if any worsening of symptoms.    Go to the Emergency Department if any weakness, numbness, loss of control of your bowel or bladder, severe worsening pain, or any other concerning symptoms.      Liveborn

## 2020-12-01 NOTE — DISCHARGE NOTE OB - MEDICATION SUMMARY - MEDICATIONS TO STOP TAKING
I will STOP taking the medications listed below when I get home from the hospital:    metFORMIN 850 mg oral tablet  -- 1 tab(s) by mouth once a day (at bedtime)    Valtrex  -- milligram(s)  once a day (at bedtime)

## 2020-12-01 NOTE — OB PROVIDER H&P - ASSESSMENT
A/P: 35 year old  at 39w6d who is being admitted to L&D for a scheduled repeat  section.   -Admit to L&D  -Consent  -Admission labs  -NPO, except ice chips   -IV fluids  -Ancef 2g ordered  -Fetus: Continuous toco and fetal monitoring.   -GBS: Negative, no GBS ppx required   -DVT ppx: Ambulate and SCD's while in bed     Discussed with  A/P: 35 year old  at 39w6d who is being admitted to L&D for a scheduled repeat  section.     -Admit to L&D  -Consent  -Admission labs  -NPO, except ice chips   -IV fluids  -Ancef 2g ordered  -Fetus: Continuous toco and fetal monitoring.   -GBS: Negative, no GBS ppx required   -DVT ppx: Ambulate and SCD's while in bed     Discussed with Dr. Lopez A/P: 35 year old  at 39w6d who is being admitted to L&D for a scheduled repeat  section.     -Admit to L&D  -Consent  -Admission labs  -NPO, except ice chips   -IV fluids  -Ancef 2g ordered  -Fetus: Continuous toco and fetal monitoring.   -GBS: Negative, no GBS ppx required   -DVT ppx: Ambulate and SCD's while in bed     Discussed with Dr. Lopez    ATTENDING NOTE  34 yo  with hx of Csection x 1, GDMA2 at 39+6 weeks presents for scheduled repeat Csection and bilateral salpingectomy. She has no complaints. VSS afebrile. FHT CAT 1, 130 moderate variability accels present, no decels, no contractions. Patient was counseled at length in the office on risks and benefits of the procedure. we reviewed the risks and benefits via   Elly. Today she had no further complaints.  She signed informed consent for the procedure. Will proceed with the scheduled surgery.    BARBARA Lopez MD A/P: 35 year old  at 39w6d who is being admitted to L&D for a scheduled repeat  section.     -Admit to L&D  -Consent  -Admission labs  -NPO, except ice chips   -IV fluids  -Ancef 2g ordered  -Fetus: Continuous toco and fetal monitoring.   -GBS: Negative, no GBS ppx required   -DVT ppx: Ambulate and SCD's while in bed     Discussed with Dr. Lopez    ATTENDING NOTE  36 yo  with hx of Csection x 1, GDMA2 at 39+ weeks presents for scheduled repeat Csection and bilateral salpingectomy. She has no complaints. VSS afebrile. FHT CAT 1, 130 moderate variability accels present, no decels, no contractions. Patient was counseled at length in the office on risks and benefits of the procedure. we reviewed the risks and benefits via   Elly. Today she had no further complaints.  She signed informed consent for the procedure. Will proceed with the scheduled surgery.    BARBARA Lopez MD

## 2020-12-01 NOTE — DISCHARGE NOTE OB - CARE PROVIDER_API CALL
Daiana Lopez  Obstetrics and Gynecology  [address] [facility] [address], [address] [place] [address]  Phone: [phone]  Fax: [phone]  Follow Up Time:

## 2020-12-01 NOTE — OB RN DELIVERY SUMMARY - NS_SEPSISRSKCALC_OBGYN_ALL_OB_FT
EOS calculated successfully. EOS Risk Factor: 0.5/1000 live births ([facility] national incidence); GA=39w1d; Temp=98.3; ROM=0.05; GBS='Negative'; Antibiotics='No antibiotics or any antibiotics < 2 hrs prior to birth'

## 2020-12-01 NOTE — OB NEONATOLOGY/PEDIATRICIAN DELIVERY SUMMARY - NSPEDSNEONOTESA_OBGYN_ALL_OB_FT
Requested by Dr. Lopez to attend this  R C/Sof a 36 y/o G [id number] mom at 39.1 weeks GA.  She had + PNC, is  A positive, HIV negative, HBsAg negative, GBS negative, Rubella Imn, RPR NR.   Maternal history  pertinent for GDM diet to medformin. History of COVID AB , HSV on Valtrex no recent outbreak .  L&D: SROM at time of delivery.  Baby born vertex with good tone and cry, transferred to warmer, orally suctioned, dried,  stimulated  Resuscitation: routine.  Apgar score 9/9. PE findings showed to father and then transferred to mom for STS.    BW: g  A:  39.1week AGA female  maternal COVID negative  P: Transition to Regular Nursery under PMD/ Hospitalist care.      Admit to NBN        Baby left in stable condition with the parents and L&D staff. Requested by Dr. Lopez to attend this  R C/Sof a 36 y/o G [id number] mom at 39.1 weeks GA.  She had + PNC, is  A positive, HIV negative, HBsAg negative, GBS negative, Rubella Imn, RPR NR.   Maternal history  pertinent for GDM diet to medformin. History of COVID AB , HSV on Valtrex no recent outbreak .  L&D: SROM at time of delivery.  Baby born vertex with good tone and cry, transferred to warmer, orally suctioned, dried,  stimulated  Resuscitation: routine.  Apgar score 9/9. PE findings showed to father and then transferred to mom for STS.    BW: 3220g  A:  39.1week AGA female  maternal COVID negative  P: Transition to Regular Nursery under PMD/ Hospitalist care.      Admit to NBN        Baby left in stable condition with the parents and L&D staff.

## 2020-12-01 NOTE — DISCHARGE NOTE OB - MEDICATION SUMMARY - MEDICATIONS TO TAKE
I will START or STAY ON the medications listed below when I get home from the hospital:    acetaminophen 325 mg oral tablet  -- 2 tab(s) by mouth every 6 hours, As Needed -for mild pain - for moderate pain   -- This product contains acetaminophen.  Do not use  with any other product containing acetaminophen to prevent possible liver damage.    -- Indication: For Pain    ibuprofen 600 mg oral tablet  -- 1 tab(s) by mouth every 6 hours, As Needed -for mild pain - for moderate pain   -- Do not take this drug if you are pregnant.  It is very important that you take or use this exactly as directed.  Do not skip doses or discontinue unless directed by your doctor.  May cause drowsiness or dizziness.  Obtain medical advice before taking any non-prescription drugs as some may affect the action of this medication.  Take with food or milk.    -- Indication: For Pain    oxyCODONE 5 mg oral tablet  -- 1 tab(s) by mouth every 6 hours, As Needed -for severe pain MDD:3  -- Caution federal law prohibits the transfer of this drug to any person other  than the person for whom it was prescribed.  It is very important that you take or use this exactly as directed.  Do not skip doses or discontinue unless directed by your doctor.  May cause drowsiness or dizziness.  This prescription cannot be refilled.  Using more of this medication than prescribed may cause serious breathing problems.    -- Indication: For Pain

## 2020-12-01 NOTE — DISCHARGE NOTE OB - PLAN OF CARE
Rapid recovery 1) Please take ibuprofen, tylenol and/or oxycodone as needed for pain as prescribed.  2) Nothing in the vagina for 6 weeks (including no sex, no tampons, and no douching).  3) Please call your doctor for a follow up your postpartum appointment in 2 weeks.  4) Please continue taking vitamins postpartum.   5) Please call the office sooner if you have heavy vaginal bleeding, severe abdominal pain, or fever > 100.4F.  6) You may resume regular daily activity as tolerated

## 2020-12-01 NOTE — DISCHARGE NOTE OB - PATIENT PORTAL LINK FT
You can access the FollowMyHealth Patient Portal offered by [facility] by registering at the following website: [email or web address]. By joining [employer]’s FollowMyHealth portal, you will also be able to view your health information using other applications (apps) compatible with our system.

## 2020-12-01 NOTE — OB PROVIDER H&P - HISTORY OF PRESENT ILLNESS
Patient is a 35 year old  at 39w6d who presents to L&D for a scheduled repeat  section.   WOLFGANG: 2020   LMP: 3/2/2020   Pregnancy course is significant for:    1. GDMA2, on metformin (850mg)   2. HSV2, on Valtrex     3. Obesity    4. COVID during pregnancy     Obhx:   G1: ,  primary C/S for   Pmhx: GDMA2, HSV2 infection   Pshx: C/S ()   Meds: PNVs, metformin (850mg), valtrex   Allergies: NKDA   BMI: 34   Ultrasound: vertex, posterior placenta ()   EFW: 3735g     GBS: Negative  HIV: NR  RPR: NR  HepB: NR  Rubella: Immune  ABO: A+  COVID negative

## 2020-12-01 NOTE — OB PROVIDER H&P - NSHPPHYSICALEXAM_GEN_ALL_CORE
Vital Signs Last 24 Hrs  T(C): 36.8 (01 Dec 2020 06:32), Max: 36.8 (01 Dec 2020 06:20)  T(F): 98.24 (01 Dec 2020 06:32), Max: 98.3 (01 Dec 2020 06:20)  HR: 81 (01 Dec 2020 06:43) (81 - 81)  BP: 112/69 (01 Dec 2020 06:43) (112/69 - 112/69)  RR: 20 (01 Dec 2020 06:20) (20 - 20)  General: Alert and oriented x3, no acute distress  Cardiovascular: regular rate and rhythm, no murmurs, rubs or gallops appreciated on exam  Respiratory: clear to auscultation bilaterally  Abdominal: gravid uterus, non-tender to palpation  Pelvic: deferred  Extremities: no redness, tenderness or swelling in lower extremities bilaterally     FHT: baseline 120 bpm, moderate variability, +accels, -deccels  [place]: no contractions    Ultrasound: vertex presentation, posterior placenta

## 2020-12-01 NOTE — DISCHARGE NOTE OB - CARE PLAN
Principal Discharge DX:	 delivery delivered  Goal:	Rapid recovery  Assessment and plan of treatment:	1) Please take ibuprofen, tylenol and/or oxycodone as needed for pain as prescribed.  2) Nothing in the vagina for 6 weeks (including no sex, no tampons, and no douching).  3) Please call your doctor for a follow up your postpartum appointment in 2 weeks.  4) Please continue taking vitamins postpartum.   5) Please call the office sooner if you have heavy vaginal bleeding, severe abdominal pain, or fever > 100.4F.  6) You may resume regular daily activity as tolerated

## 2020-12-02 LAB
BASOPHILS # BLD AUTO: 0.03 K/UL — SIGNIFICANT CHANGE UP (ref 0–0.2)
BASOPHILS NFR BLD AUTO: 0.3 % — SIGNIFICANT CHANGE UP (ref 0–2)
EOSINOPHIL # BLD AUTO: 0.03 K/UL — SIGNIFICANT CHANGE UP (ref 0–0.5)
EOSINOPHIL NFR BLD AUTO: 0.3 % — SIGNIFICANT CHANGE UP (ref 0–6)
HCT VFR BLD CALC: 33.3 % — LOW (ref 34.5–45)
HGB BLD-MCNC: 11.1 G/DL — LOW (ref 11.5–15.5)
IMM GRANULOCYTES NFR BLD AUTO: 0.4 % — SIGNIFICANT CHANGE UP (ref 0–1.5)
LYMPHOCYTES # BLD AUTO: 1.35 K/UL — SIGNIFICANT CHANGE UP (ref 1–3.3)
LYMPHOCYTES # BLD AUTO: 14 % — SIGNIFICANT CHANGE UP (ref 13–44)
MCHC RBC-ENTMCNC: 28.8 PG — SIGNIFICANT CHANGE UP (ref 27–34)
MCHC RBC-ENTMCNC: 33.3 GM/DL — SIGNIFICANT CHANGE UP (ref 32–36)
MCV RBC AUTO: 86.5 FL — SIGNIFICANT CHANGE UP (ref 80–100)
MONOCYTES # BLD AUTO: 0.74 K/UL — SIGNIFICANT CHANGE UP (ref 0–0.9)
MONOCYTES NFR BLD AUTO: 7.7 % — SIGNIFICANT CHANGE UP (ref 2–14)
NEUTROPHILS # BLD AUTO: 7.48 K/UL — HIGH (ref 1.8–7.4)
NEUTROPHILS NFR BLD AUTO: 77.3 % — HIGH (ref 43–77)
PLATELET # BLD AUTO: 211 K/UL — SIGNIFICANT CHANGE UP (ref 150–400)
RBC # BLD: 3.85 M/UL — SIGNIFICANT CHANGE UP (ref 3.8–5.2)
RBC # FLD: 14.6 % — HIGH (ref 10.3–14.5)
WBC # BLD: 9.67 K/UL — SIGNIFICANT CHANGE UP (ref 3.8–10.5)
WBC # FLD AUTO: 9.67 K/UL — SIGNIFICANT CHANGE UP (ref 3.8–10.5)

## 2020-12-02 RX ORDER — VALACYCLOVIR 500 MG/1
0 TABLET, FILM COATED ORAL
Qty: 0 | Refills: 0 | DISCHARGE

## 2020-12-02 RX ORDER — METFORMIN HYDROCHLORIDE 850 MG/1
1 TABLET ORAL
Qty: 0 | Refills: 0 | DISCHARGE

## 2020-12-02 RX ORDER — OXYCODONE HYDROCHLORIDE 5 MG/1
1 TABLET ORAL
Qty: 10 | Refills: 0
Start: 2020-12-02

## 2020-12-02 RX ORDER — IBUPROFEN 200 MG
1 TABLET ORAL
Qty: 56 | Refills: 0
Start: 2020-12-02 | End: 2020-12-15

## 2020-12-02 RX ORDER — ACETAMINOPHEN 500 MG
2 TABLET ORAL
Qty: 112 | Refills: 0
Start: 2020-12-02 | End: 2020-12-15

## 2020-12-02 RX ADMIN — ENOXAPARIN SODIUM 40 MILLIGRAM(S): 100 INJECTION SUBCUTANEOUS at 20:02

## 2020-12-02 RX ADMIN — Medication 30 MILLIGRAM(S): at 09:43

## 2020-12-02 RX ADMIN — Medication 30 MILLIGRAM(S): at 03:35

## 2020-12-02 RX ADMIN — Medication 975 MILLIGRAM(S): at 23:28

## 2020-12-02 RX ADMIN — Medication 30 MILLIGRAM(S): at 09:28

## 2020-12-02 RX ADMIN — Medication 30 MILLIGRAM(S): at 20:30

## 2020-12-02 RX ADMIN — Medication 30 MILLIGRAM(S): at 03:20

## 2020-12-02 RX ADMIN — Medication 30 MILLIGRAM(S): at 15:45

## 2020-12-02 RX ADMIN — Medication 975 MILLIGRAM(S): at 13:17

## 2020-12-02 RX ADMIN — Medication 975 MILLIGRAM(S): at 23:44

## 2020-12-02 RX ADMIN — Medication 30 MILLIGRAM(S): at 16:00

## 2020-12-02 RX ADMIN — Medication 30 MILLIGRAM(S): at 20:01

## 2020-12-02 RX ADMIN — Medication 975 MILLIGRAM(S): at 12:17

## 2020-12-02 NOTE — PROGRESS NOTE ADULT - SUBJECTIVE AND OBJECTIVE BOX
Name: MONIK SCHAFER  MRN: [id number]  Date Admitted: 20  Location: [facility]2013 ([facility])  Attending: Daiana Lopez      Post Partum Note:     MONIK SCHAFER is a 35y  s/p repeat  section and bilateral salpingectomy POD #1. Viable Female infant.    SUBJECTIVE:  No acute events overnight. Pain is well controlled with PRN pain medication. No problems with ambulating, voiding, or PO intake. Has had flatus but no BM. Denies N/V. Patient is having normal lochia which is decreasing.    She is breastfeeding and the baby is latching on.     OBJECTIVE:    Vital Signs Last 24 Hrs  T(C): 36.7 (02 Dec 2020 04:35), Max: 36.9 (01 Dec 2020 15:23)  T(F): 98 (02 Dec 2020 04:35), Max: 98.5 (01 Dec 2020 23:54)  HR: 69 (02 Dec 2020 04:35) (63 - 81)  BP: 94/58 (02 Dec 2020 04:35) (90/50 - 118/92)  BP(mean): 75 (01 Dec 2020 11:50) (68 - 101)  RR: 18 (02 Dec 2020 04:35) (16 - 20)  SpO2: 97% (02 Dec 2020 04:35) (97% - 100%)    Physical exam:  General: AOx3, NAD.  Heart: RRR. S1S2.  Lungs: CTABL. Good airflow bilaterally.   Abdomen: +BS, Soft, appropriately tender, nondistended, no guarding or rebound tenderness, firm uterine fundus at umbilicus, the incision is clean dry and intact with steri-strips. No erythema or discharge.  Ext: No DVT signs, warm extremities.        LABS:        MONIK SCHAFER is a 35y  s/p repeat  section and bilateral salpingectomy POD #1. Viable Female infant.  - Pain controlled on current medications  -Tolerating po,   - +/- flatus  - +/- void  - hgb    -->                : Plan   -  +/- Lovenox for DVT prophylaxis   - Rh -/+   Rhogam received/ordered  - Pt with male infant wants/denies circumscision  - Pt with female infant   - Dispo: Home pending attending approval/Continue post op care   Name: MONIK SCHAFER  MRN: [id number]  Date Admitted: 20  Location: [facility] 2013 ([facility])  Attending: Daiana Lopez      Post Partum Note:     MONIK SCHAFER is a 35y  s/p repeat  section and bilateral salpingectomy POD #1. Viable Female infant.    SUBJECTIVE:  No acute events overnight. Pain is well controlled with PRN pain medication. No problems with ambulating, voiding, or PO intake. Has had flatus but no BM. Denies N/V. Patient is having normal lochia which is decreasing.    She is breastfeeding and the baby is latching on.     OBJECTIVE:    Vital Signs Last 24 Hrs  T(C): 36.7 (02 Dec 2020 04:35), Max: 36.9 (01 Dec 2020 15:23)  T(F): 98 (02 Dec 2020 04:35), Max: 98.5 (01 Dec 2020 23:54)  HR: 69 (02 Dec 2020 04:35) (63 - 81)  BP: 94/58 (02 Dec 2020 04:35) (90/50 - 118/92)  BP(mean): 75 (01 Dec 2020 11:50) (68 - 101)  RR: 18 (02 Dec 2020 04:35) (16 - 20)  SpO2: 97% (02 Dec 2020 04:35) (97% - 100%)    Physical exam:  General: AOx3, NAD.  Heart: RRR. S1S2.  Lungs: CTABL. Good airflow bilaterally.   Abdomen: +BS, Soft, appropriately tender, nondistended, no guarding or rebound tenderness, firm uterine fundus at umbilicus, the incision is clean dry and intact with steri-strips. No erythema or discharge.  Ext: No DVT signs, warm extremities.        LABS: Am cbc pending

## 2020-12-03 LAB — SURGICAL PATHOLOGY STUDY: SIGNIFICANT CHANGE UP

## 2020-12-03 RX ORDER — IBUPROFEN 200 MG
600 TABLET ORAL EVERY 6 HOURS
Refills: 0 | Status: DISCONTINUED | OUTPATIENT
Start: 2020-12-03 | End: 2020-12-04

## 2020-12-03 RX ADMIN — Medication 30 MILLIGRAM(S): at 03:36

## 2020-12-03 RX ADMIN — Medication 975 MILLIGRAM(S): at 05:43

## 2020-12-03 RX ADMIN — Medication 30 MILLIGRAM(S): at 04:14

## 2020-12-03 RX ADMIN — Medication 600 MILLIGRAM(S): at 13:24

## 2020-12-03 RX ADMIN — Medication 975 MILLIGRAM(S): at 22:29

## 2020-12-03 RX ADMIN — Medication 600 MILLIGRAM(S): at 17:53

## 2020-12-03 RX ADMIN — Medication 600 MILLIGRAM(S): at 18:23

## 2020-12-03 RX ADMIN — ENOXAPARIN SODIUM 40 MILLIGRAM(S): 100 INJECTION SUBCUTANEOUS at 21:22

## 2020-12-03 RX ADMIN — Medication 975 MILLIGRAM(S): at 06:23

## 2020-12-03 RX ADMIN — Medication 600 MILLIGRAM(S): at 12:54

## 2020-12-03 RX ADMIN — Medication 975 MILLIGRAM(S): at 21:22

## 2020-12-03 NOTE — PROGRESS NOTE ADULT - ASSESSMENT
ASSESSMENT  MONIK SCHAFER is a 35y  who is post-op day #2 who delivered a female infant at 39w6d by repeat  delivery and bilateral salpingectomy. No acute events.     PLAN  1. Routine post-op care  - Continue to encourage ambulation, PO intake and breastfeeding  - DVT prophylaxis: lovenox, SCDs  - Continue pain management PRN  - Plan to discharge post-op day 3 ASSESSMENT  MONIK SCHAFER is a 35y  who is post-op day #2 who delivered a female infant at 39w6d by repeat  delivery and bilateral salpingectomy. No acute events.     PLAN  1. Routine post-op care  - Continue to encourage ambulation, PO intake and breastfeeding  - DVT prophylaxis: lovenox, SCDs  - Continue pain management PRN  - continue intrapartum care     I have read and agree with everything in the above note.     Clarisa Ledbetter- PGY4

## 2020-12-03 NOTE — PROGRESS NOTE ADULT - SUBJECTIVE AND OBJECTIVE BOX
Delivery Progress Note    MONIK SCHAFER is a 35y  who is post-op day #2 who delivered a female infant at 39w6d by repeat  delivery and bilateral salpingectomy.     SUBJECTIVE:  No acute events overnight. Pain is well controlled with PRN pain medication. No problems with ambulating, voiding, or PO intake. Has had flatus but no BM. Denies nausea and vomiting. Patient is having appropriate lochia which is decreasing.     OBJECTIVE:  Physical exam:  Vital Signs Last 24 Hrs  T(C): 36.7 (03 Dec 2020 02:40), Max: 37.3 (02 Dec 2020 10:12)  T(F): 98 (03 Dec 2020 02:40), Max: 99.1 (02 Dec 2020 10:12)  HR: 79 (03 Dec 2020 02:40) (79 - 87)  BP: 97/62 (03 Dec 2020 02:40) (90/58 - 97/62)  RR: 17 (03 Dec 2020 02:40) (17 - 18)  SpO2: 97% (03 Dec 2020 02:40) (97% - 100%)  General: alert and oriented x3, no acute distress.  Heart: regular rate and rhythm, no murmurs, rubs or gallops appreciated.  Lungs: clear to auscultation bilaterally.   breast: soft, no tenderness to palpation.  Abdomen: Soft, appropriately tender to palpitation, firm uterine fundus at umbilicus. Incision appears dry and intact with steri strips.  Extremities: no tenderness, redness or swelling in lower extremities bilaterally.     Labs:                         11.1   9.67  )-----------( 211      ( 02 Dec 2020 06:43 )             33.3

## 2020-12-03 NOTE — PROGRESS NOTE ADULT - SUBJECTIVE AND OBJECTIVE BOX
Subjective:  The patient feels well.  She is ambulating without difficulty.  She is tolerating PO.  She is voiding.  She denies nausea and vomiting.  Her pain is controlled.  She reports normal postpartum bleeding      Physical exam:    Vital Signs Last 24 Hrs  T(C): 36.7 (03 Dec 2020 02:40), Max: 37.3 (02 Dec 2020 10:12)  T(F): 98 (03 Dec 2020 02:40), Max: 99.1 (02 Dec 2020 10:12)  HR: 79 (03 Dec 2020 02:40) (79 - 87)  BP: 97/62 (03 Dec 2020 02:40) (90/58 - 97/62)  BP(mean): --  RR: 17 (03 Dec 2020 02:40) (17 - 18)  SpO2: 97% (03 Dec 2020 02:40) (97% - 100%)    Gen: NAD  Abdomen: Soft, nontender, no distension , firm uterine fundus at umbilicus.  Incision: Clean, dry, and intact with steri strips  Pelvic: Normal lochia noted  Ext: No calf tenderness    LABS:                        11.1   9.67  )-----------( 211      ( 02 Dec 2020 06:43 )             33.3     blood type    A/P POD # 2 s/p    Doing well.  Encourage ambulation.  Patient request to satay another day  Prescriptions for percocet and motrin electronically sent to the pharmacy.  F/U in 2 weeks for incision check.  Call for fevers, chills, nausea, vomiting, heavy vaginal bleeding, vaginal discharge, severe pain, symptoms of depression, problems with incision or any other concerning symptoms.  Nothing in vagina and no heavy lifting x 6 weeks.  No driving x 2 weeks.  Questions answered.

## 2020-12-04 VITALS
TEMPERATURE: 98 F | RESPIRATION RATE: 18 BRPM | HEART RATE: 78 BPM | SYSTOLIC BLOOD PRESSURE: 100 MMHG | DIASTOLIC BLOOD PRESSURE: 66 MMHG | OXYGEN SATURATION: 98 %

## 2020-12-04 PROCEDURE — 86900 BLOOD TYPING SEROLOGIC ABO: CPT

## 2020-12-04 PROCEDURE — 88307 TISSUE EXAM BY PATHOLOGIST: CPT

## 2020-12-04 PROCEDURE — 59025 FETAL NON-STRESS TEST: CPT

## 2020-12-04 PROCEDURE — 86901 BLOOD TYPING SEROLOGIC RH(D): CPT

## 2020-12-04 PROCEDURE — 82962 GLUCOSE BLOOD TEST: CPT

## 2020-12-04 PROCEDURE — 88302 TISSUE EXAM BY PATHOLOGIST: CPT

## 2020-12-04 PROCEDURE — 86850 RBC ANTIBODY SCREEN: CPT

## 2020-12-04 PROCEDURE — 36415 COLL VENOUS BLD VENIPUNCTURE: CPT

## 2020-12-04 PROCEDURE — 85025 COMPLETE CBC W/AUTO DIFF WBC: CPT

## 2020-12-04 PROCEDURE — 76815 OB US LIMITED FETUS(S): CPT

## 2020-12-04 PROCEDURE — 59050 FETAL MONITOR W/REPORT: CPT

## 2020-12-04 PROCEDURE — 86769 SARS-COV-2 COVID-19 ANTIBODY: CPT

## 2020-12-04 PROCEDURE — 86780 TREPONEMA PALLIDUM: CPT

## 2020-12-04 PROCEDURE — T1013: CPT

## 2020-12-04 PROCEDURE — G0463: CPT

## 2020-12-04 RX ADMIN — Medication 975 MILLIGRAM(S): at 15:56

## 2020-12-04 RX ADMIN — Medication 975 MILLIGRAM(S): at 10:03

## 2020-12-04 RX ADMIN — Medication 600 MILLIGRAM(S): at 01:15

## 2020-12-04 RX ADMIN — Medication 600 MILLIGRAM(S): at 06:28

## 2020-12-04 RX ADMIN — Medication 600 MILLIGRAM(S): at 05:34

## 2020-12-04 RX ADMIN — Medication 600 MILLIGRAM(S): at 12:30

## 2020-12-04 RX ADMIN — Medication 600 MILLIGRAM(S): at 11:56

## 2020-12-04 RX ADMIN — Medication 975 MILLIGRAM(S): at 11:00

## 2020-12-04 RX ADMIN — Medication 600 MILLIGRAM(S): at 00:16

## 2020-12-04 NOTE — PROGRESS NOTE ADULT - ASSESSMENT
ASSESSMENT  MONIK SCHAFER is a 35y  who is post-op day #3 who delivered a female infant at 39w6d by repeat  delivery and bilateral salpingectomy. No acute events.     PLAN  1. Routine post-op care  - Continue to encourage ambulation, PO intake and breastfeeding  - DVT prophylaxis: lovenox, SCDs  - Continue pain management PRN  - Infant had decreased birthweight, will follow up with pediatrics to coordinate discharge for both mother and infant   ASSESSMENT  MONIK SCHAFER is a 35y  who is post-op day #3 who delivered a female infant at 39w6d by repeat  delivery and bilateral salpingectomy. No acute events.     PLAN  1. Routine post-op care  - Continue to encourage ambulation, PO intake and breastfeeding  - DVT prophylaxis: lovenox, SCDs  - Continue pain management PRN  - Infant had decreased birthweight, will follow up with pediatrics to coordinate discharge for both mother and infant    I have read and agree with everything in the above note.     Clarisa Ledbetter- PGY4

## 2020-12-04 NOTE — PROGRESS NOTE ADULT - SUBJECTIVE AND OBJECTIVE BOX
Subjective:  The patient feels well.  She is ambulating without difficulty.  She is tolerating PO.  She is voiding.  She denies nausea and vomiting.  Her pain is controlled.  She reports normal postpartum bleeding      Physical exam:    Vital Signs Last 24 Hrs  T(C): 36.8 (04 Dec 2020 05:00), Max: 37 (03 Dec 2020 15:37)  T(F): 98.2 (04 Dec 2020 05:00), Max: 98.6 (03 Dec 2020 15:37)  HR: 78 (04 Dec 2020 05:00) (78 - 80)  BP: 102/68 (04 Dec 2020 05:00) (98/67 - 102/68)  BP(mean): --  RR: 20 (04 Dec 2020 05:00) (18 - 20)  SpO2: 98% (04 Dec 2020 05:00) (98% - 98%)    Gen: NAD  Breast : Breast feeding  Abdomen: Soft, nontender, no distension , firm uterine fundus at umbilicus.  Incision: Clean, dry, and intact with steri strips  Pelvic: Normal lochia noted  Ext: No calf tenderness    LABS:    blood type    A/P POD # 3 s/p    Doing well.  Encourage ambulation.  Discharge home today.  Prescriptions for percocet and motrin electronically sent to the pharmacy.  F/U in 2 weeks for incision check.  Call for fevers, chills, nausea, vomiting, heavy vaginal bleeding, vaginal discharge, severe pain, symptoms of depression, problems with incision or any other concerning symptoms.  Nothing in vagina and no heavy lifting x 6 weeks.  No driving x 2 weeks.  Questions answered.

## 2020-12-04 NOTE — PROGRESS NOTE ADULT - SUBJECTIVE AND OBJECTIVE BOX
[facility] Delivery Progress Note    MONIK SCHAFER is a 35y  who is post-op day #3 who delivered a female infant at 39w6d by repeat  delivery and bilateral salpingectomy.     SUBJECTIVE:  No acute events overnight. Pain is well controlled with PRN pain medication. No problems with ambulating, voiding, or PO intake. Has had flatus and a BM. Denies nausea and vomiting. Patient is having appropriate lochia which is decreasing. Infant had decreased birthweight and is being evaluated by pediatrics.     OBJECTIVE:  Physical exam:  Vital Signs Last 24 Hrs  T(C): 36.7 (03 Dec 2020 02:40), Max: 37.3 (02 Dec 2020 10:12)  T(F): 98 (03 Dec 2020 02:40), Max: 99.1 (02 Dec 2020 10:12)  HR: 79 (03 Dec 2020 02:40) (79 - 87)  BP: 97/62 (03 Dec 2020 02:40) (90/58 - 97/62)  RR: 17 (03 Dec 2020 02:40) (17 - 18)  SpO2: 97% (03 Dec 2020 02:40) (97% - 100%)  General: alert and oriented x3, no acute distress.  Heart: regular rate and rhythm, no murmurs, rubs or gallops appreciated.  Lungs: clear to auscultation bilaterally.   breast: soft, no tenderness to palpation.  Abdomen: Soft, appropriately tender to palpitation, firm uterine fundus at umbilicus. Incision appears dry and intact with steri strips.  Extremities: no tenderness, redness or swelling in lower extremities bilaterally.     Labs:                         11.1   9.67  )-----------( 211      ( 02 Dec 2020 06:43 )             33.3                         [facility] Delivery Progress Note    MONIK SCHAFER is a 35y  who is post-op day #3 who delivered a female infant at 39w6d by repeat  delivery and bilateral salpingectomy.     SUBJECTIVE:  No acute events overnight. Pain is well controlled with PRN pain medication. No problems with ambulating, voiding, or PO intake. Has had flatus and a BM. Denies nausea and vomiting. Patient is having appropriate lochia which is decreasing. Infant had decreased birthweight and is being evaluated by pediatrics.     OBJECTIVE:  Physical exam:  Vital Signs Last 24 Hrs  T(C): 36.7 (03 Dec 2020 02:40), Max: 37.3 (02 Dec 2020 10:12)  T(F): 98 (03 Dec 2020 02:40), Max: 99.1 (02 Dec 2020 10:12)  HR: 79 (03 Dec 2020 02:40) (79 - 87)  BP: 97/62 (03 Dec 2020 02:40) (90/58 - 97/62)  RR: 17 (03 Dec 2020 02:40) (17 - 18)  SpO2: 97% (03 Dec 2020 02:40) (97% - 100%)    General: alert and oriented x3, no acute distress.  Heart: regular rate and rhythm, no murmurs, rubs or gallops appreciated.  Lungs: clear to auscultation bilaterally.   breast: soft, no tenderness to palpation.  Abdomen: Soft, appropriately tender to palpitation, firm uterine fundus at umbilicus. Incision appears dry and intact with steri strips.  Extremities: no tenderness, redness or swelling in lower extremities bilaterally.     Labs:                         11.1   9.67  )-----------( 211      ( 02 Dec 2020 06:43 )             33.3

## 2020-12-05 ENCOUNTER — NON-APPOINTMENT (OUTPATIENT)
Age: 35
End: 2020-12-05

## 2020-12-07 ENCOUNTER — NON-APPOINTMENT (OUTPATIENT)
Age: 35
End: 2020-12-07

## 2020-12-08 ENCOUNTER — NON-APPOINTMENT (OUTPATIENT)
Age: 35
End: 2020-12-08

## 2020-12-09 ENCOUNTER — APPOINTMENT (OUTPATIENT)
Dept: OBGYN | Facility: CLINIC | Age: 35
End: 2020-12-09
Payer: MEDICAID

## 2020-12-09 VITALS
DIASTOLIC BLOOD PRESSURE: 76 MMHG | BODY MASS INDEX: 33.79 KG/M2 | WEIGHT: 203.06 LBS | SYSTOLIC BLOOD PRESSURE: 110 MMHG

## 2020-12-09 DIAGNOSIS — Z51.89 ENCOUNTER FOR OTHER SPECIFIED AFTERCARE: ICD-10-CM

## 2020-12-09 PROCEDURE — 0503F POSTPARTUM CARE VISIT: CPT

## 2020-12-09 NOTE — HISTORY OF PRESENT ILLNESS
[Delivery Date: ___] : on [unfilled] [Breastfeeding] : currently nursing [None] : The patient is currently asymptomatic [FreeTextEntry8] : spoke to the patient via Susana STAHL. She has no complaints. She is breast feeding. She denies any depressive symptoms [de-identified] : Repeat Csection and bilateral salpingectomy [de-identified] : Abdomen- obese, soft, nondistended, mild incisional tenderness, Incision  clean, dry  intact with steri-strips [de-identified] : Incision healing well. Hx of GDMA2 Path reviewed with the patient  [de-identified] :  return in 4 weeks for PP visit 2 H GTT in 6 weeks

## 2020-12-11 ENCOUNTER — NON-APPOINTMENT (OUTPATIENT)
Age: 35
End: 2020-12-11

## 2020-12-18 ENCOUNTER — NON-APPOINTMENT (OUTPATIENT)
Age: 35
End: 2020-12-18

## 2020-12-18 DIAGNOSIS — O99.213 OBESITY COMPLICATING PREGNANCY, THIRD TRIMESTER: ICD-10-CM

## 2020-12-18 DIAGNOSIS — Z34.83 ENCOUNTER FOR SUPERVISION OF OTHER NORMAL PREGNANCY, THIRD TRIMESTER: ICD-10-CM

## 2020-12-18 DIAGNOSIS — Z64.1 PROBLEMS RELATED TO MULTIPARITY: ICD-10-CM

## 2020-12-18 DIAGNOSIS — O24.415 GESTATIONAL DIABETES MELLITUS IN PREGNANCY, CONTROLLED BY ORAL HYPOGLYCEMIC DRUGS: ICD-10-CM

## 2020-12-18 DIAGNOSIS — Z34.90 ENCOUNTER FOR SUPERVISION OF NORMAL PREGNANCY, UNSPECIFIED, UNSPECIFIED TRIMESTER: ICD-10-CM

## 2020-12-18 DIAGNOSIS — O34.219 MATERNAL CARE FOR UNSPECIFIED TYPE SCAR FROM PREVIOUS CESAREAN DELIVERY: ICD-10-CM

## 2020-12-23 ENCOUNTER — NON-APPOINTMENT (OUTPATIENT)
Age: 35
End: 2020-12-23

## 2020-12-29 ENCOUNTER — NON-APPOINTMENT (OUTPATIENT)
Age: 35
End: 2020-12-29

## 2021-01-04 ENCOUNTER — NON-APPOINTMENT (OUTPATIENT)
Age: 36
End: 2021-01-04

## 2021-01-06 ENCOUNTER — APPOINTMENT (OUTPATIENT)
Dept: OBGYN | Facility: CLINIC | Age: 36
End: 2021-01-06

## 2021-03-23 NOTE — OB RN PREOPERATIVE CHECKLIST - NS_PREOPCHKPREADMCARBDRINK_OBGYN_ALL_OB
TRANSFER - OUT REPORT:    Verbal report given to Terrie(name) on Melinda Vega  being transferred to [facility](unit) for routine progression of care       Report consisted of patients Situation, Background, Assessment and   Recommendations(SBAR). Information from the following report(s) SBAR, Kardex, Intake/Output and MAR was reviewed with the receiving nurse. Lines:   Peripheral IV 03/22/21 Right Antecubital (Active)   Site Assessment Clean, dry, & intact 03/22/21 1105   Phlebitis Assessment 0 03/22/21 1105   Infiltration Assessment 0 03/22/21 1105   Dressing Status Clean, dry, & intact 03/22/21 1105   Dressing Type Tape;Transparent 03/22/21 1105       Peripheral IV 03/22/21 Left;Posterior Hand (Active)        Opportunity for questions and clarification was provided.       Patient transported with:   Monitor No

## 2021-10-28 NOTE — OB PST NOTE - SOURCE OF INFORMATION, OB PROFILE
10/28/21 Attempt to contact member from number listed in chart received vm, left message. Call was to f/u with med assistance for eliquis.   patient

## 2022-11-08 NOTE — OB RN PREOPERATIVE CHECKLIST - NS PREOP CHK MONITOR ANESTHESIA CONSENT
Consent was obtained from the patient. The risks and benefits to therapy were discussed in detail. Specifically, the risks of infection, scarring, bleeding, prolonged wound healing, incomplete removal, allergy to anesthesia, nerve injury and recurrence were addressed. Prior to the procedure, the treatment site was clearly identified and confirmed by the patient. All components of Universal Protocol/PAUSE Rule completed. done

## 2023-02-09 NOTE — OB RN PATIENT PROFILE - INFANT HOME WITH MOTHER, OB PROFILE
yes O-Z Plasty Text: The defect edges were debeveled with a #15 scalpel blade.  Given the location of the defect, shape of the defect and the proximity to free margins an O-Z plasty (double transposition flap) was deemed most appropriate.  Using a sterile surgical marker, the appropriate transposition flaps were drawn incorporating the defect and placing the expected incisions within the relaxed skin tension lines where possible.    The area thus outlined was incised deep to adipose tissue with a #15 scalpel blade.  The skin margins were undermined to an appropriate distance in all directions utilizing iris scissors.  Hemostasis was achieved with electrocautery.  The flaps were then transposed into place, one clockwise and the other counterclockwise, and anchored with interrupted buried subcutaneous sutures.

## 2023-06-29 ENCOUNTER — NON-APPOINTMENT (OUTPATIENT)
Age: 38
End: 2023-06-29

## 2023-08-01 ENCOUNTER — APPOINTMENT (OUTPATIENT)
Dept: OBGYN | Facility: CLINIC | Age: 38
End: 2023-08-01
Payer: MEDICAID

## 2023-08-01 VITALS — BODY MASS INDEX: 35.32 KG/M2 | HEIGHT: 65 IN | WEIGHT: 212 LBS

## 2023-08-01 DIAGNOSIS — N76.0 ACUTE VAGINITIS: ICD-10-CM

## 2023-08-01 DIAGNOSIS — A60.04 HERPESVIRAL VULVOVAGINITIS: ICD-10-CM

## 2023-08-01 DIAGNOSIS — Z12.4 ENCOUNTER FOR SCREENING FOR MALIGNANT NEOPLASM OF CERVIX: ICD-10-CM

## 2023-08-01 DIAGNOSIS — Z11.3 ENCOUNTER FOR SCREENING FOR INFECTIONS WITH A PREDOMINANTLY SEXUAL MODE OF TRANSMISSION: ICD-10-CM

## 2023-08-01 PROCEDURE — 99385 PREV VISIT NEW AGE 18-39: CPT

## 2023-08-01 PROCEDURE — 99202 OFFICE O/P NEW SF 15 MIN: CPT | Mod: 25

## 2023-08-01 RX ORDER — VALACYCLOVIR 1 G/1
1 TABLET, FILM COATED ORAL
Qty: 5 | Refills: 3 | Status: ACTIVE | COMMUNITY
Start: 2023-08-01 | End: 1900-01-01

## 2023-08-01 NOTE — HISTORY OF PRESENT ILLNESS
[FreeTextEntry1] : 38yo presents to establish care with practice. Due for annual GYN exam. Reports itchiness in the  vagina ansd burning. Pt requesting reflll for valtrex. Reports she also thinks she has odor in her vagina LMP 08/01/2023

## 2023-08-03 DIAGNOSIS — B37.9 CANDIDIASIS, UNSPECIFIED: ICD-10-CM

## 2023-08-03 LAB
C TRACH RRNA SPEC QL NAA+PROBE: NOT DETECTED
CANDIDA VAG CYTO: DETECTED
CYTOLOGY CVX/VAG DOC THIN PREP: NORMAL
G VAGINALIS+PREV SP MTYP VAG QL MICRO: NOT DETECTED
HPV HIGH+LOW RISK DNA PNL CVX: NOT DETECTED
N GONORRHOEA RRNA SPEC QL NAA+PROBE: NOT DETECTED
SOURCE TP AMPLIFICATION: NORMAL
T VAGINALIS VAG QL WET PREP: NOT DETECTED

## 2023-08-03 RX ORDER — FLUCONAZOLE 150 MG/1
150 TABLET ORAL DAILY
Qty: 3 | Refills: 0 | Status: ACTIVE | COMMUNITY
Start: 2023-08-03 | End: 1900-01-01

## 2023-09-13 LAB — CYTOLOGY CVX/VAG DOC THIN PREP: NORMAL

## 2024-01-10 ENCOUNTER — NON-APPOINTMENT (OUTPATIENT)
Age: 39
End: 2024-01-10

## 2024-09-03 ENCOUNTER — APPOINTMENT (OUTPATIENT)
Dept: OBGYN | Facility: CLINIC | Age: 39
End: 2024-09-03

## 2024-11-04 ENCOUNTER — NON-APPOINTMENT (OUTPATIENT)
Age: 39
End: 2024-11-04

## 2025-03-07 ENCOUNTER — NON-APPOINTMENT (OUTPATIENT)
Age: 40
End: 2025-03-07

## 2025-03-31 NOTE — OB PST NOTE - NSPRENATALCARE_OBGYN_ALL_OB
LOV 2/21/25   RTO ---  LRF 2/21/2025          Controlled Substance Monitoring:    Acute and Chronic Pain Monitoring:        No data to display                       Yes